# Patient Record
Sex: MALE | Race: WHITE | Employment: FULL TIME | URBAN - METROPOLITAN AREA
[De-identification: names, ages, dates, MRNs, and addresses within clinical notes are randomized per-mention and may not be internally consistent; named-entity substitution may affect disease eponyms.]

---

## 2017-02-09 PROBLEM — K21.9 LPRD (LARYNGOPHARYNGEAL REFLUX DISEASE): Status: ACTIVE | Noted: 2017-02-09

## 2017-02-09 PROBLEM — R09.89 GLOBUS PHARYNGEUS: Status: ACTIVE | Noted: 2017-02-09

## 2017-02-09 PROBLEM — R19.8 GLOBUS PHARYNGEUS: Status: ACTIVE | Noted: 2017-02-09

## 2017-02-09 PROBLEM — R09.A2 GLOBUS PHARYNGEUS: Status: ACTIVE | Noted: 2017-02-09

## 2017-10-03 ENCOUNTER — OFFICE VISIT (OUTPATIENT)
Dept: INTERNAL MEDICINE CLINIC | Facility: CLINIC | Age: 42
End: 2017-10-03

## 2017-10-03 VITALS
WEIGHT: 315 LBS | BODY MASS INDEX: 44.1 KG/M2 | TEMPERATURE: 98 F | HEART RATE: 85 BPM | OXYGEN SATURATION: 97 % | HEIGHT: 71 IN | RESPIRATION RATE: 17 BRPM | DIASTOLIC BLOOD PRESSURE: 84 MMHG | SYSTOLIC BLOOD PRESSURE: 128 MMHG

## 2017-10-03 DIAGNOSIS — Z00.00 ROUTINE GENERAL MEDICAL EXAMINATION AT A HEALTH CARE FACILITY: Primary | ICD-10-CM

## 2017-10-03 DIAGNOSIS — I83.891 VARICOSE VEINS OF RIGHT LOWER EXTREMITY WITH EDEMA: ICD-10-CM

## 2017-10-03 PROBLEM — R09.89 GLOBUS PHARYNGEUS: Status: RESOLVED | Noted: 2017-02-09 | Resolved: 2017-10-03

## 2017-10-03 PROBLEM — R19.8 GLOBUS PHARYNGEUS: Status: RESOLVED | Noted: 2017-02-09 | Resolved: 2017-10-03

## 2017-10-03 PROBLEM — R09.A2 GLOBUS PHARYNGEUS: Status: RESOLVED | Noted: 2017-02-09 | Resolved: 2017-10-03

## 2017-10-03 PROCEDURE — 99396 PREV VISIT EST AGE 40-64: CPT | Performed by: INTERNAL MEDICINE

## 2017-10-03 NOTE — PATIENT INSTRUCTIONS
Return to clinic prn if no better   Patient to continue weight reduction. Patient is fully well versed with his treatment options.

## 2017-10-03 NOTE — PROGRESS NOTES
Whitney Gaona  1975 is a 38year old male. Patient presents with:  Physical    HPI     Varicose veins see Dr. Joan Serrano earlier.     Current Outpatient Prescriptions:  OMEPRAZOLE 40 MG Oral Capsule Delayed Release TAKE 1 CAPSULE BY MOUTH ONE TIME A D Palpitations none. PND (paroxsymal nocturnal dyspnea) none. Gastrointestinal:   Patient denies abdominal pain, acid reflux, blood in stool, vomiting, nausea, heartburn denies any wt gain no change in appetite noted no change in bowel movement noted.  Dysp Trace right lower extremity  Heart sounds: normal.   Murmurs: none. Rhythm: regular. LUNGS:   Auscultation: clear . Chest Shape: normal .   Percussion: normal.   Rales: no .   Respiratory effort: normal .   Rhonchi: no.   Wheezes: no.    ABDOMEN:   Thornell Keri options. The patient indicates understanding of these issues and agrees to the plan. The patient is asked to Return in about 6 months (around 4/3/2018).       Shonda Hamm MD

## 2017-10-09 ENCOUNTER — LAB ENCOUNTER (OUTPATIENT)
Dept: LAB | Age: 42
End: 2017-10-09
Attending: INTERNAL MEDICINE
Payer: COMMERCIAL

## 2017-10-09 DIAGNOSIS — Z00.00 ROUTINE GENERAL MEDICAL EXAMINATION AT A HEALTH CARE FACILITY: ICD-10-CM

## 2017-10-09 PROCEDURE — 84443 ASSAY THYROID STIM HORMONE: CPT

## 2017-10-09 PROCEDURE — 80061 LIPID PANEL: CPT

## 2017-10-09 PROCEDURE — 85025 COMPLETE CBC W/AUTO DIFF WBC: CPT

## 2017-10-09 PROCEDURE — 81003 URINALYSIS AUTO W/O SCOPE: CPT

## 2017-10-09 PROCEDURE — 80053 COMPREHEN METABOLIC PANEL: CPT

## 2017-10-09 PROCEDURE — 83036 HEMOGLOBIN GLYCOSYLATED A1C: CPT

## 2017-10-09 PROCEDURE — 36415 COLL VENOUS BLD VENIPUNCTURE: CPT

## 2017-10-09 PROCEDURE — 84436 ASSAY OF TOTAL THYROXINE: CPT

## 2018-09-08 ENCOUNTER — APPOINTMENT (OUTPATIENT)
Dept: GENERAL RADIOLOGY | Age: 43
End: 2018-09-08
Attending: EMERGENCY MEDICINE
Payer: COMMERCIAL

## 2018-09-08 ENCOUNTER — HOSPITAL ENCOUNTER (OUTPATIENT)
Age: 43
Discharge: HOME | End: 2018-09-08
Attending: EMERGENCY MEDICINE
Payer: COMMERCIAL

## 2018-09-08 VITALS
SYSTOLIC BLOOD PRESSURE: 137 MMHG | TEMPERATURE: 98 F | HEART RATE: 98 BPM | DIASTOLIC BLOOD PRESSURE: 92 MMHG | RESPIRATION RATE: 14 BRPM | OXYGEN SATURATION: 98 %

## 2018-09-08 DIAGNOSIS — L03.116 CELLULITIS OF LEFT LOWER EXTREMITY: Primary | ICD-10-CM

## 2018-09-08 DIAGNOSIS — S80.12XA CONTUSION OF LEFT LOWER EXTREMITY, INITIAL ENCOUNTER: ICD-10-CM

## 2018-09-08 LAB
#LYMPHOCYTE IC: 1.7 X10ˆ3/UL (ref 0.9–3.2)
#MXD IC: 0.7 X10ˆ3/UL (ref 0.1–1)
#NEUTROPHIL IC: 4.6 X10ˆ3/UL (ref 1.3–6.7)
HCT IC: 41.3 % (ref 37–54)
HGB IC: 13.9 G/DL (ref 13–17)
LYMPHOCYTES NFR BLD AUTO: 24.3 %
MCH IC: 28 PG (ref 27–33.2)
MCHC IC: 33.7 G/DL (ref 31–37)
MCV IC: 83.3 FL (ref 80–99)
MIXED CELL %: 9.6 %
NEUTROPHILS NFR BLD AUTO: 66.1 %
PLT IC: 283 X10ˆ3/UL (ref 150–450)
RBC IC: 4.96 X10ˆ6/UL (ref 4.3–5.7)
WBC IC: 7 X10ˆ3/UL (ref 4–13)

## 2018-09-08 PROCEDURE — 99204 OFFICE O/P NEW MOD 45 MIN: CPT

## 2018-09-08 PROCEDURE — 85025 COMPLETE CBC W/AUTO DIFF WBC: CPT | Performed by: EMERGENCY MEDICINE

## 2018-09-08 PROCEDURE — 99214 OFFICE O/P EST MOD 30 MIN: CPT

## 2018-09-08 PROCEDURE — 73590 X-RAY EXAM OF LOWER LEG: CPT | Performed by: EMERGENCY MEDICINE

## 2018-09-08 PROCEDURE — 96365 THER/PROPH/DIAG IV INF INIT: CPT

## 2018-09-08 RX ORDER — CEPHALEXIN 500 MG/1
500 CAPSULE ORAL 4 TIMES DAILY
Qty: 40 CAPSULE | Refills: 0 | Status: SHIPPED | OUTPATIENT
Start: 2018-09-08 | End: 2018-09-11 | Stop reason: ALTCHOICE

## 2018-09-08 NOTE — ED NOTES
Returned from [facility] without problems. Rocephin complete. Tolerated without issues.  No complaints

## 2018-09-08 NOTE — ED PROVIDER NOTES
Patient Seen in: [facility]    History   Patient presents with:  Cellulitis (integumentary, infectious)    Stated Complaint: L leg pain/injury     HPI  This is a 28-year-old male who states he slid into a pool about 1 week ago s respiratory distress. The patient is in no respiratory distress    HEENT: There is no signs of trauma. Oral mucosa is wet.     Lungs: Clear to auscultation without wheezing or retractions    Cardiovascular: Regular without murmurs    Extremities: Good pul in the left tibia/fibula. Mild soft tissue swelling. Dictated by: Chaitanya Campbell MD on 9/08/2018 at 13:18     Approved by: Chaitanya Campbell MD            The patient CBC shows white count 7 hemoglobin 13, platelets are 565.   This is finding cellulitis

## 2018-09-08 NOTE — ED INITIAL ASSESSMENT (HPI)
Pt here after he fell in pool 1 week ago and scraped the left lower leg. Pt now w.  Cellulitis type look that is increasing in size, some tightness in the calf

## 2018-09-11 ENCOUNTER — OFFICE VISIT (OUTPATIENT)
Dept: INTERNAL MEDICINE CLINIC | Facility: CLINIC | Age: 43
End: 2018-09-11
Payer: COMMERCIAL

## 2018-09-11 ENCOUNTER — APPOINTMENT (OUTPATIENT)
Dept: ULTRASOUND IMAGING | Facility: HOSPITAL | Age: 43
End: 2018-09-11
Attending: EMERGENCY MEDICINE
Payer: COMMERCIAL

## 2018-09-11 ENCOUNTER — HOSPITAL ENCOUNTER (EMERGENCY)
Facility: HOSPITAL | Age: 43
Discharge: HOME | End: 2018-09-11
Attending: EMERGENCY MEDICINE
Payer: COMMERCIAL

## 2018-09-11 ENCOUNTER — APPOINTMENT (OUTPATIENT)
Dept: GENERAL RADIOLOGY | Facility: HOSPITAL | Age: 43
End: 2018-09-11
Attending: EMERGENCY MEDICINE
Payer: COMMERCIAL

## 2018-09-11 VITALS
BODY MASS INDEX: 44.82 KG/M2 | RESPIRATION RATE: 16 BRPM | TEMPERATURE: 98 F | HEIGHT: 70 IN | OXYGEN SATURATION: 98 % | SYSTOLIC BLOOD PRESSURE: 148 MMHG | DIASTOLIC BLOOD PRESSURE: 76 MMHG | HEART RATE: 71 BPM | WEIGHT: 313.06 LBS

## 2018-09-11 VITALS
HEART RATE: 87 BPM | TEMPERATURE: 98 F | DIASTOLIC BLOOD PRESSURE: 76 MMHG | OXYGEN SATURATION: 99 % | WEIGHT: 312.5 LBS | RESPIRATION RATE: 16 BRPM | SYSTOLIC BLOOD PRESSURE: 116 MMHG | BODY MASS INDEX: 45 KG/M2

## 2018-09-11 DIAGNOSIS — T14.8XXA HEMATOMA: Primary | ICD-10-CM

## 2018-09-11 DIAGNOSIS — L03.116 CELLULITIS OF LEFT LOWER EXTREMITY: ICD-10-CM

## 2018-09-11 DIAGNOSIS — L03.116 CELLULITIS OF LEFT LOWER EXTREMITY: Primary | ICD-10-CM

## 2018-09-11 PROBLEM — L03.90 CELLULITIS: Status: ACTIVE | Noted: 2018-09-11

## 2018-09-11 LAB
ALBUMIN SERPL-MCNC: 3.8 G/DL (ref 3.5–4.8)
ALBUMIN/GLOB SERPL: 1.2 {RATIO} (ref 1–2)
ALP LIVER SERPL-CCNC: 55 U/L (ref 45–117)
ALT SERPL-CCNC: 30 U/L (ref 17–63)
ANION GAP SERPL CALC-SCNC: 5 MMOL/L (ref 0–18)
AST SERPL-CCNC: 18 U/L (ref 15–41)
BASOPHILS # BLD AUTO: 0.03 X10(3) UL (ref 0–0.1)
BASOPHILS NFR BLD AUTO: 0.5 %
BILIRUB SERPL-MCNC: 0.6 MG/DL (ref 0.1–2)
BUN BLD-MCNC: 22 MG/DL (ref 8–20)
BUN/CREAT SERPL: 21.2 (ref 10–20)
CALCIUM BLD-MCNC: 8.7 MG/DL (ref 8.3–10.3)
CHLORIDE SERPL-SCNC: 104 MMOL/L (ref 101–111)
CO2 SERPL-SCNC: 30 MMOL/L (ref 22–32)
CREAT BLD-MCNC: 1.04 MG/DL (ref 0.7–1.3)
EOSINOPHIL # BLD AUTO: 0.14 X10(3) UL (ref 0–0.3)
EOSINOPHIL NFR BLD AUTO: 2.4 %
ERYTHROCYTE [DISTWIDTH] IN BLOOD BY AUTOMATED COUNT: 12.2 % (ref 11.5–16)
GLOBULIN PLAS-MCNC: 3.3 G/DL (ref 2.5–4)
GLUCOSE BLD-MCNC: 102 MG/DL (ref 70–99)
HCT VFR BLD AUTO: 39.4 % (ref 37–53)
HGB BLD-MCNC: 13.2 G/DL (ref 13–17)
IMMATURE GRANULOCYTE COUNT: 0.02 X10(3) UL (ref 0–1)
IMMATURE GRANULOCYTE RATIO %: 0.3 %
LACTIC ACID: 0.6 MMOL/L (ref 0.5–2)
LYMPHOCYTES # BLD AUTO: 1.39 X10(3) UL (ref 0.9–4)
LYMPHOCYTES NFR BLD AUTO: 24.3 %
M PROTEIN MFR SERPL ELPH: 7.1 G/DL (ref 6.1–8.3)
MCH RBC QN AUTO: 28.1 PG (ref 27–33.2)
MCHC RBC AUTO-ENTMCNC: 33.5 G/DL (ref 31–37)
MCV RBC AUTO: 83.8 FL (ref 80–99)
MONOCYTES # BLD AUTO: 0.59 X10(3) UL (ref 0.1–1)
MONOCYTES NFR BLD AUTO: 10.3 %
NEUTROPHIL ABS PRELIM: 3.56 X10 (3) UL (ref 1.3–6.7)
NEUTROPHILS # BLD AUTO: 3.56 X10(3) UL (ref 1.3–6.7)
NEUTROPHILS NFR BLD AUTO: 62.2 %
OSMOLALITY SERPL CALC.SUM OF ELEC: 292 MOSM/KG (ref 275–295)
PLATELET # BLD AUTO: 252 10(3)UL (ref 150–450)
POTASSIUM SERPL-SCNC: 3.8 MMOL/L (ref 3.6–5.1)
RBC # BLD AUTO: 4.7 X10(6)UL (ref 4.3–5.7)
RED CELL DISTRIBUTION WIDTH-SD: 37.2 FL (ref 35.1–46.3)
SODIUM SERPL-SCNC: 139 MMOL/L (ref 136–144)
WBC # BLD AUTO: 5.7 X10(3) UL (ref 4–13)

## 2018-09-11 PROCEDURE — 87205 SMEAR GRAM STAIN: CPT | Performed by: EMERGENCY MEDICINE

## 2018-09-11 PROCEDURE — 36415 COLL VENOUS BLD VENIPUNCTURE: CPT

## 2018-09-11 PROCEDURE — 80053 COMPREHEN METABOLIC PANEL: CPT | Performed by: EMERGENCY MEDICINE

## 2018-09-11 PROCEDURE — 10061 I&D ABSCESS COMP/MULTIPLE: CPT

## 2018-09-11 PROCEDURE — S0077 INJECTION, CLINDAMYCIN PHOSP: HCPCS | Performed by: EMERGENCY MEDICINE

## 2018-09-11 PROCEDURE — 85025 COMPLETE CBC W/AUTO DIFF WBC: CPT | Performed by: EMERGENCY MEDICINE

## 2018-09-11 PROCEDURE — 96375 TX/PRO/DX INJ NEW DRUG ADDON: CPT

## 2018-09-11 PROCEDURE — 99284 EMERGENCY DEPT VISIT MOD MDM: CPT

## 2018-09-11 PROCEDURE — 87070 CULTURE OTHR SPECIMN AEROBIC: CPT | Performed by: EMERGENCY MEDICINE

## 2018-09-11 PROCEDURE — 87040 BLOOD CULTURE FOR BACTERIA: CPT | Performed by: EMERGENCY MEDICINE

## 2018-09-11 PROCEDURE — 73590 X-RAY EXAM OF LOWER LEG: CPT | Performed by: EMERGENCY MEDICINE

## 2018-09-11 PROCEDURE — 96365 THER/PROPH/DIAG IV INF INIT: CPT

## 2018-09-11 PROCEDURE — 83605 ASSAY OF LACTIC ACID: CPT | Performed by: EMERGENCY MEDICINE

## 2018-09-11 PROCEDURE — 99213 OFFICE O/P EST LOW 20 MIN: CPT | Performed by: INTERNAL MEDICINE

## 2018-09-11 PROCEDURE — 93971 EXTREMITY STUDY: CPT | Performed by: EMERGENCY MEDICINE

## 2018-09-11 RX ORDER — ONDANSETRON 2 MG/ML
4 INJECTION INTRAMUSCULAR; INTRAVENOUS ONCE
Status: COMPLETED | OUTPATIENT
Start: 2018-09-11 | End: 2018-09-11

## 2018-09-11 RX ORDER — CLINDAMYCIN HYDROCHLORIDE 300 MG/1
300 CAPSULE ORAL 3 TIMES DAILY
Qty: 30 CAPSULE | Refills: 0 | Status: SHIPPED | OUTPATIENT
Start: 2018-09-11 | End: 2018-09-18

## 2018-09-11 RX ORDER — CLINDAMYCIN PHOSPHATE 600 MG/50ML
600 INJECTION INTRAVENOUS ONCE
Status: COMPLETED | OUTPATIENT
Start: 2018-09-11 | End: 2018-09-11

## 2018-09-11 RX ORDER — ONDANSETRON 2 MG/ML
INJECTION INTRAMUSCULAR; INTRAVENOUS
Status: DISCONTINUED
Start: 2018-09-11 | End: 2018-09-11

## 2018-09-11 NOTE — ED INITIAL ASSESSMENT (HPI)
Two days ago patient to urgent care for left shin cellulitis. Was given abx, outlined the cellulitis and sent home. About an hour ago patient noticed some redness spreading and swelling down to the left foot.

## 2018-09-11 NOTE — ED PROVIDER NOTES
Patient Seen in: [facility] Emergency Department    History   Patient presents with:  Cellulitis (integumentary, infectious)    Stated Complaint: swelling left lower extremity    HPI    The patient is a 47-year-old male presenting to the emergency r reviewed and negative except as noted above.     Physical Exam     ED Triage Vitals [09/11/18 0115]   /72   Pulse 76   Resp 16   Temp 98.3 °F (36.8 °C)   Temp src Temporal   SpO2 100 %   O2 Device None (Room air)       Current:/76   Pulse 71   T involvement.   Psych: Normal interaction, cooperative with exam       ED Course     Labs Reviewed   COMP METABOLIC PANEL (14) - Abnormal; Notable for the following components:       Result Value    Glucose 102 (*)     BUN 22 (*)     BUN/CREA Ratio 21.2 (*) anesthetized with 5 mL of lidocaine 1%. Skin was cleaned. A linear incision was made with an 11 blade scalpel. The cavity was opened with a hemostat and large amount of old appearing clotted blood was expressed. Hemostasis was achieved.   Culture was co

## 2018-09-11 NOTE — PROGRESS NOTES
Connie Tomas  1975 is a 41year old male. Patient presents with: Follow - Up      HPI:   Seen in  ER this morning. ER note has been reviewed. Here because he already had an appointment scheduled.   An incision drainage with packing put in

## 2018-09-15 ENCOUNTER — OFFICE VISIT (OUTPATIENT)
Dept: INTERNAL MEDICINE CLINIC | Facility: CLINIC | Age: 43
End: 2018-09-15
Payer: COMMERCIAL

## 2018-09-15 DIAGNOSIS — L03.116 CELLULITIS OF LEFT LOWER EXTREMITY: Primary | ICD-10-CM

## 2018-09-15 PROCEDURE — 99213 OFFICE O/P EST LOW 20 MIN: CPT | Performed by: INTERNAL MEDICINE

## 2018-09-15 NOTE — PROGRESS NOTES
Hellen Caicedo  1975 is a 41year old male. No chief complaint on file.       HPI:   Getting  better not 100%    Current Outpatient Medications:  Clindamycin HCl 300 MG Oral Cap Take 1 capsule (300 mg total) by mouth 3 (three) times daily for 1

## 2018-09-18 ENCOUNTER — OFFICE VISIT (OUTPATIENT)
Dept: INTERNAL MEDICINE CLINIC | Facility: CLINIC | Age: 43
End: 2018-09-18
Payer: COMMERCIAL

## 2018-09-18 DIAGNOSIS — Z02.9 ADMINISTRATIVE ENCOUNTER: Primary | ICD-10-CM

## 2018-09-18 RX ORDER — CLINDAMYCIN HYDROCHLORIDE 300 MG/1
300 CAPSULE ORAL 3 TIMES DAILY
Qty: 30 CAPSULE | Refills: 0 | Status: SHIPPED | OUTPATIENT
Start: 2018-09-18 | End: 2018-09-28

## 2018-09-18 NOTE — PROGRESS NOTES
Hellen Caicedo  1975 is a 41year old male. No chief complaint on file. Wound dressing change    Current Outpatient Medications:  Clindamycin HCl 300 MG Oral Cap Take 1 capsule (300 mg total) by mouth 3 (three) times daily for 10 days.  [place] [employer]

## 2018-09-21 ENCOUNTER — OFFICE VISIT (OUTPATIENT)
Dept: INTERNAL MEDICINE CLINIC | Facility: CLINIC | Age: 43
End: 2018-09-21
Payer: COMMERCIAL

## 2018-09-21 DIAGNOSIS — L03.116 CELLULITIS OF LEFT LOWER EXTREMITY: Primary | ICD-10-CM

## 2018-09-27 ENCOUNTER — TELEPHONE (OUTPATIENT)
Dept: INTERNAL MEDICINE CLINIC | Facility: CLINIC | Age: 43
End: 2018-09-27

## 2018-09-27 NOTE — TELEPHONE ENCOUNTER
He should be seen by any available provider to determine if additional course of antibiotics is needed.

## 2018-09-27 NOTE — TELEPHONE ENCOUNTER
Spoke with pt - offered several different OV times. Only OV time that pt could do was tomorrow at 0730. OV scheduled.   Future Appointments   Date Time Provider Nicole Chatmani   9/28/2018  7:30 AM Robert Chowdary MD [facility] [place]

## 2018-09-27 NOTE — TELEPHONE ENCOUNTER
Pt states that cellulitis has not gotten worse and its slowly getting better. Pt denies fever and reports decreased redness, decreased swelling and decreased pain.  Pt states that he only has 3 days left of abx and states that will not be enough for the faizan

## 2018-09-28 ENCOUNTER — OFFICE VISIT (OUTPATIENT)
Dept: INTERNAL MEDICINE CLINIC | Facility: CLINIC | Age: 43
End: 2018-09-28
Payer: COMMERCIAL

## 2018-09-28 VITALS
OXYGEN SATURATION: 97 % | HEART RATE: 70 BPM | WEIGHT: 311.25 LBS | BODY MASS INDEX: 43.57 KG/M2 | TEMPERATURE: 98 F | RESPIRATION RATE: 16 BRPM | SYSTOLIC BLOOD PRESSURE: 120 MMHG | HEIGHT: 71 IN | DIASTOLIC BLOOD PRESSURE: 74 MMHG

## 2018-09-28 DIAGNOSIS — L03.116 CELLULITIS OF LEFT LOWER EXTREMITY: Primary | ICD-10-CM

## 2018-09-28 PROCEDURE — 99214 OFFICE O/P EST MOD 30 MIN: CPT | Performed by: INTERNAL MEDICINE

## 2018-09-28 RX ORDER — CLINDAMYCIN HYDROCHLORIDE 300 MG/1
300 CAPSULE ORAL 3 TIMES DAILY
Qty: 21 CAPSULE | Refills: 0 | Status: SHIPPED | OUTPATIENT
Start: 2018-09-28 | End: 2018-10-05

## 2018-09-28 NOTE — PROGRESS NOTES
Levon Barnes is a 41year old male. HPI:   Patient presents with:  Wound Recheck: Left lower leg   Patient presents for follow up on cellulitis. It is of the left lower extremity. Initially seen at immediate care on 9/8.   Injured left leg 1 week pr Obesity, unspecified, CICI (obstructive sleep apnea), Spondylosis (6/18/2012), and Varicose veins of leg with pain (3/30/2015).   Surgical:  has a past surgical history that includes tonsillectomy; cholecystectomy; and ENDO LUMINAL LASER ABLATION (Right, 11/ Jc Baron MD as PCP - General (Internal Medicine)  The patient indicates understanding of these issues and agrees to the plan.   The patient is asked to return to clinic in 6-12 months with Dr. Ahsan Rosario MD for follow up on chronic issues, or earlier if a

## 2018-09-28 NOTE — PATIENT INSTRUCTIONS
- Continue current antibiotic course (which will end in 3 days)  - After that, we will do another week of antibiotics  - Let us know if you have constant watery diarrhea (soft stools are ok)  - Continue with daily antibiotic.     It was a pleasure seeing yo

## 2018-10-22 ENCOUNTER — OFFICE VISIT (OUTPATIENT)
Dept: INTERNAL MEDICINE CLINIC | Facility: CLINIC | Age: 43
End: 2018-10-22
Payer: COMMERCIAL

## 2018-10-22 VITALS
HEIGHT: 68.5 IN | SYSTOLIC BLOOD PRESSURE: 128 MMHG | WEIGHT: 315 LBS | BODY MASS INDEX: 47.19 KG/M2 | DIASTOLIC BLOOD PRESSURE: 82 MMHG | HEART RATE: 84 BPM | OXYGEN SATURATION: 96 % | RESPIRATION RATE: 13 BRPM | TEMPERATURE: 98 F

## 2018-10-22 DIAGNOSIS — E66.01 CLASS 3 SEVERE OBESITY DUE TO EXCESS CALORIES WITH BODY MASS INDEX (BMI) OF 40.0 TO 44.9 IN ADULT, UNSPECIFIED WHETHER SERIOUS COMORBIDITY PRESENT (HCC): ICD-10-CM

## 2018-10-22 DIAGNOSIS — Z00.00 ROUTINE GENERAL MEDICAL EXAMINATION AT A HEALTH CARE FACILITY: Primary | ICD-10-CM

## 2018-10-22 PROBLEM — E66.813 CLASS 3 SEVERE OBESITY DUE TO EXCESS CALORIES WITH BODY MASS INDEX (BMI) OF 40.0 TO 44.9 IN ADULT (HCC): Status: ACTIVE | Noted: 2018-10-22

## 2018-10-22 PROBLEM — L03.90 CELLULITIS: Status: RESOLVED | Noted: 2018-09-11 | Resolved: 2018-10-22

## 2018-10-22 PROBLEM — E66.813 CLASS 3 SEVERE OBESITY DUE TO EXCESS CALORIES WITH BODY MASS INDEX (BMI) OF 40.0 TO 44.9 IN ADULT: Status: ACTIVE | Noted: 2018-10-22

## 2018-10-22 PROCEDURE — 99396 PREV VISIT EST AGE 40-64: CPT | Performed by: INTERNAL MEDICINE

## 2018-10-22 RX ORDER — OMEPRAZOLE 40 MG/1
40 CAPSULE, DELAYED RELEASE ORAL
Qty: 1 CAPSULE | Refills: 0 | Status: SHIPPED | OUTPATIENT
Start: 2018-10-22 | End: 2018-10-23

## 2018-10-22 NOTE — TELEPHONE ENCOUNTER
Estefania Valentine from [facility] is calling to get clarification on the directions for prescription Omeprazole 40 MG Oral Capsule Delayed Release. Please advise.

## 2018-10-22 NOTE — PROGRESS NOTES
Lobo RUBIO 1975 is a 41year old male. Patient presents with:  CPX    HPI         Current Outpatient Medications:  Omeprazole 40 MG Oral Capsule Delayed Release Take 1 capsule (40 mg total) by mouth every 3 (three) months.  Disp: 1 capsule R cholesterol/hypertension. Leg edema none. Orthopnea no. Palpitations none. PND (paroxsymal nocturnal dyspnea) none.    Gastrointestinal:   Patient denies abdominal pain, , blood in stool, vomiting, nausea, heartburn denies any wt gain no change in appetite normal.   Thyroid: unremarkable. HEART:   Clicks: no.   Edema: Trace right lower extremity  Heart sounds: normal.   Murmurs: none. Rhythm: regular. LUNGS:   Auscultation: clear .    Chest Shape: normal .   Percussion: normal.   Rales: no .   Respirato Release; Take 1 capsule (40 mg total) by mouth every 3 (three) months. Patient Instructions   Will discuss weight loss options on follow-up      The patient indicates understanding of these issues and agrees to the plan.   The patient is asked to Ret

## 2018-10-23 RX ORDER — OMEPRAZOLE 40 MG/1
40 CAPSULE, DELAYED RELEASE ORAL DAILY
Qty: 90 CAPSULE | Refills: 0 | Status: SHIPPED | OUTPATIENT
Start: 2018-10-23 | End: 2019-01-21

## 2018-11-27 ENCOUNTER — APPOINTMENT (OUTPATIENT)
Dept: LAB | Age: 43
End: 2018-11-27
Attending: INTERNAL MEDICINE
Payer: COMMERCIAL

## 2018-11-27 DIAGNOSIS — E66.01 CLASS 3 SEVERE OBESITY DUE TO EXCESS CALORIES WITH BODY MASS INDEX (BMI) OF 40.0 TO 44.9 IN ADULT, UNSPECIFIED WHETHER SERIOUS COMORBIDITY PRESENT (HCC): ICD-10-CM

## 2018-11-27 DIAGNOSIS — Z00.00 ROUTINE GENERAL MEDICAL EXAMINATION AT A HEALTH CARE FACILITY: ICD-10-CM

## 2018-11-27 PROCEDURE — 81003 URINALYSIS AUTO W/O SCOPE: CPT

## 2018-11-27 PROCEDURE — 84681 ASSAY OF C-PEPTIDE: CPT

## 2018-11-27 PROCEDURE — 83525 ASSAY OF INSULIN: CPT

## 2018-11-27 PROCEDURE — 84443 ASSAY THYROID STIM HORMONE: CPT

## 2018-11-27 PROCEDURE — 36415 COLL VENOUS BLD VENIPUNCTURE: CPT

## 2018-11-27 PROCEDURE — 84436 ASSAY OF TOTAL THYROXINE: CPT

## 2018-11-27 PROCEDURE — 80061 LIPID PANEL: CPT

## 2018-11-27 PROCEDURE — 83036 HEMOGLOBIN GLYCOSYLATED A1C: CPT

## 2019-11-08 ENCOUNTER — TELEPHONE (OUTPATIENT)
Dept: INTERNAL MEDICINE CLINIC | Facility: CLINIC | Age: 44
End: 2019-11-08

## 2019-11-08 NOTE — TELEPHONE ENCOUNTER
Letter of medical necessity received from [address] [place] [address] for pts Cpap supplies - form has been placed in provider inbox for completion.

## 2019-11-09 NOTE — TELEPHONE ENCOUNTER
Faxed letter of medical necessity with confirmation to [facility]     Fax # [phone]     Placed copy to scan and also placed copy in teal accordion folder in VM/KS/LS pod

## 2020-01-06 ENCOUNTER — APPOINTMENT (OUTPATIENT)
Dept: LAB | Age: 45
End: 2020-01-06
Attending: INTERNAL MEDICINE
Payer: COMMERCIAL

## 2020-01-06 ENCOUNTER — OFFICE VISIT (OUTPATIENT)
Dept: INTERNAL MEDICINE CLINIC | Facility: CLINIC | Age: 45
End: 2020-01-06
Payer: COMMERCIAL

## 2020-01-06 VITALS
TEMPERATURE: 98 F | HEART RATE: 66 BPM | DIASTOLIC BLOOD PRESSURE: 84 MMHG | BODY MASS INDEX: 43.12 KG/M2 | WEIGHT: 284.5 LBS | SYSTOLIC BLOOD PRESSURE: 118 MMHG | OXYGEN SATURATION: 99 % | RESPIRATION RATE: 16 BRPM | HEIGHT: 68.25 IN

## 2020-01-06 DIAGNOSIS — N50.819 TESTICULAR PAIN, UNSPECIFIED: ICD-10-CM

## 2020-01-06 DIAGNOSIS — Z30.09 VASECTOMY EVALUATION: ICD-10-CM

## 2020-01-06 DIAGNOSIS — Z00.00 ROUTINE GENERAL MEDICAL EXAMINATION AT A HEALTH CARE FACILITY: Primary | ICD-10-CM

## 2020-01-06 DIAGNOSIS — G47.33 OSA (OBSTRUCTIVE SLEEP APNEA): ICD-10-CM

## 2020-01-06 LAB
ALBUMIN SERPL-MCNC: 3.8 G/DL (ref 3.4–5)
ALBUMIN/GLOB SERPL: 1.2 {RATIO} (ref 1–2)
ALP LIVER SERPL-CCNC: 55 U/L (ref 45–117)
ALT SERPL-CCNC: 24 U/L (ref 16–61)
ANION GAP SERPL CALC-SCNC: 2 MMOL/L (ref 0–18)
AST SERPL-CCNC: 13 U/L (ref 15–37)
BASOPHILS # BLD AUTO: 0.04 X10(3) UL (ref 0–0.2)
BASOPHILS NFR BLD AUTO: 0.6 %
BILIRUB SERPL-MCNC: 0.7 MG/DL (ref 0.1–2)
BILIRUB UR QL STRIP.AUTO: NEGATIVE
BUN BLD-MCNC: 16 MG/DL (ref 7–18)
BUN/CREAT SERPL: 15.2 (ref 10–20)
CALCIUM BLD-MCNC: 8.9 MG/DL (ref 8.5–10.1)
CHLORIDE SERPL-SCNC: 108 MMOL/L (ref 98–112)
CHOLEST SMN-MCNC: 146 MG/DL (ref ?–200)
CLARITY UR REFRACT.AUTO: CLEAR
CO2 SERPL-SCNC: 30 MMOL/L (ref 21–32)
COLOR UR AUTO: YELLOW
CREAT BLD-MCNC: 1.05 MG/DL (ref 0.7–1.3)
DEPRECATED RDW RBC AUTO: 39.8 FL (ref 35.1–46.3)
EOSINOPHIL # BLD AUTO: 0.11 X10(3) UL (ref 0–0.7)
EOSINOPHIL NFR BLD AUTO: 1.8 %
ERYTHROCYTE [DISTWIDTH] IN BLOOD BY AUTOMATED COUNT: 12.7 % (ref 11–15)
EST. AVERAGE GLUCOSE BLD GHB EST-MCNC: 108 MG/DL (ref 68–126)
GLOBULIN PLAS-MCNC: 3.3 G/DL (ref 2.8–4.4)
GLUCOSE BLD-MCNC: 93 MG/DL (ref 70–99)
GLUCOSE UR STRIP.AUTO-MCNC: NEGATIVE MG/DL
HBA1C MFR BLD HPLC: 5.4 % (ref ?–5.7)
HCT VFR BLD AUTO: 44.5 % (ref 39–53)
HDLC SERPL-MCNC: 43 MG/DL (ref 40–59)
HGB BLD-MCNC: 14.2 G/DL (ref 13–17.5)
IMM GRANULOCYTES # BLD AUTO: 0.01 X10(3) UL (ref 0–1)
IMM GRANULOCYTES NFR BLD: 0.2 %
KETONES UR STRIP.AUTO-MCNC: NEGATIVE MG/DL
LDLC SERPL CALC-MCNC: 90 MG/DL (ref ?–100)
LEUKOCYTE ESTERASE UR QL STRIP.AUTO: NEGATIVE
LYMPHOCYTES # BLD AUTO: 1.98 X10(3) UL (ref 1–4)
LYMPHOCYTES NFR BLD AUTO: 31.6 %
M PROTEIN MFR SERPL ELPH: 7.1 G/DL (ref 6.4–8.2)
MCH RBC QN AUTO: 27.6 PG (ref 26–34)
MCHC RBC AUTO-ENTMCNC: 31.9 G/DL (ref 31–37)
MCV RBC AUTO: 86.4 FL (ref 80–100)
MONOCYTES # BLD AUTO: 0.5 X10(3) UL (ref 0.1–1)
MONOCYTES NFR BLD AUTO: 8 %
NEUTROPHILS # BLD AUTO: 3.62 X10 (3) UL (ref 1.5–7.7)
NEUTROPHILS # BLD AUTO: 3.62 X10(3) UL (ref 1.5–7.7)
NEUTROPHILS NFR BLD AUTO: 57.8 %
NITRITE UR QL STRIP.AUTO: NEGATIVE
NONHDLC SERPL-MCNC: 103 MG/DL (ref ?–130)
OSMOLALITY SERPL CALC.SUM OF ELEC: 291 MOSM/KG (ref 275–295)
PATIENT FASTING Y/N/NP: YES
PATIENT FASTING Y/N/NP: YES
PH UR STRIP.AUTO: 6 [PH] (ref 4.5–8)
PLATELET # BLD AUTO: 278 10(3)UL (ref 150–450)
POTASSIUM SERPL-SCNC: 4.7 MMOL/L (ref 3.5–5.1)
PROT UR STRIP.AUTO-MCNC: NEGATIVE MG/DL
RBC # BLD AUTO: 5.15 X10(6)UL (ref 4.3–5.7)
RBC UR QL AUTO: NEGATIVE
SODIUM SERPL-SCNC: 140 MMOL/L (ref 136–145)
SP GR UR STRIP.AUTO: 1.02 (ref 1–1.03)
TRIGL SERPL-MCNC: 66 MG/DL (ref 30–149)
TSI SER-ACNC: 1.01 MIU/ML (ref 0.36–3.74)
UROBILINOGEN UR STRIP.AUTO-MCNC: <2 MG/DL
VLDLC SERPL CALC-MCNC: 13 MG/DL (ref 0–30)
WBC # BLD AUTO: 6.3 X10(3) UL (ref 4–11)

## 2020-01-06 PROCEDURE — 80053 COMPREHEN METABOLIC PANEL: CPT | Performed by: INTERNAL MEDICINE

## 2020-01-06 PROCEDURE — 84443 ASSAY THYROID STIM HORMONE: CPT | Performed by: INTERNAL MEDICINE

## 2020-01-06 PROCEDURE — 99396 PREV VISIT EST AGE 40-64: CPT | Performed by: INTERNAL MEDICINE

## 2020-01-06 PROCEDURE — 80061 LIPID PANEL: CPT | Performed by: INTERNAL MEDICINE

## 2020-01-06 PROCEDURE — 81001 URINALYSIS AUTO W/SCOPE: CPT | Performed by: INTERNAL MEDICINE

## 2020-01-06 PROCEDURE — 83036 HEMOGLOBIN GLYCOSYLATED A1C: CPT | Performed by: INTERNAL MEDICINE

## 2020-01-06 PROCEDURE — 36415 COLL VENOUS BLD VENIPUNCTURE: CPT | Performed by: INTERNAL MEDICINE

## 2020-01-06 PROCEDURE — 85025 COMPLETE CBC W/AUTO DIFF WBC: CPT | Performed by: INTERNAL MEDICINE

## 2020-01-06 NOTE — PROGRESS NOTES
Dian Rebollar  1975 is a 45year old male. Patient presents with:  Physical    HPI    no cc    Current Outpatient Medications   Medication Sig Dispense Refill   • Multiple Vitamins-Minerals (MULTIVITAL-M) Oral Tab Take 1 tablet by mouth daily. blood in stool, vomiting, nausea, heartburn \ no change in appetite noted no change in bowel movement noted.  Dysphagia none.: vol weight loss eating low carbs and sensibly with portion control   Hematology:   Patient denies abnormal bleeding, easy bruising Motion: normal.   Thyroid: unremarkable. HEART:   Clicks: no.   Edema: Trace right lower extremity  Heart sounds: normal.   Murmurs: none. Rhythm: regular. LUNGS:   Auscultation: clear . Chest Shape: normal .   Percussion: normal.   Rales: no .    R indicates understanding of these issues and agrees to the plan. The patient is asked to Return in about 6 months (around 7/6/2020) for result discussion.       Shonda Hamm MD

## 2020-05-17 ENCOUNTER — PATIENT MESSAGE (OUTPATIENT)
Dept: INTERNAL MEDICINE CLINIC | Facility: CLINIC | Age: 45
End: 2020-05-17

## 2020-05-17 DIAGNOSIS — G47.33 OSA (OBSTRUCTIVE SLEEP APNEA): Primary | Chronic | ICD-10-CM

## 2020-05-18 NOTE — TELEPHONE ENCOUNTER
From: Levon Barnes  To: Musa Gabriel MD  Sent: 5/17/2020 10:45 AM CDT  Subject: Non-Urgent Medical Question    My cpap machine is pretty old and the humidifier stopped working.  While I have a prescription for parts I don't have one for a machine or a

## 2020-10-19 ENCOUNTER — OFFICE VISIT (OUTPATIENT)
Dept: INTERNAL MEDICINE CLINIC | Facility: CLINIC | Age: 45
End: 2020-10-19
Payer: COMMERCIAL

## 2020-10-19 VITALS
WEIGHT: 303.38 LBS | OXYGEN SATURATION: 98 % | HEIGHT: 68.25 IN | RESPIRATION RATE: 18 BRPM | HEART RATE: 78 BPM | BODY MASS INDEX: 45.98 KG/M2 | DIASTOLIC BLOOD PRESSURE: 76 MMHG | SYSTOLIC BLOOD PRESSURE: 122 MMHG | TEMPERATURE: 98 F

## 2020-10-19 DIAGNOSIS — G47.33 OSA (OBSTRUCTIVE SLEEP APNEA): Chronic | ICD-10-CM

## 2020-10-19 DIAGNOSIS — E66.01 CLASS 3 SEVERE OBESITY DUE TO EXCESS CALORIES WITH SERIOUS COMORBIDITY AND BODY MASS INDEX (BMI) OF 40.0 TO 44.9 IN ADULT (HCC): ICD-10-CM

## 2020-10-19 DIAGNOSIS — R79.89 LOW TESTOSTERONE: Primary | ICD-10-CM

## 2020-10-19 PROCEDURE — 99213 OFFICE O/P EST LOW 20 MIN: CPT | Performed by: INTERNAL MEDICINE

## 2020-10-19 PROCEDURE — 90686 IIV4 VACC NO PRSV 0.5 ML IM: CPT | Performed by: INTERNAL MEDICINE

## 2020-10-19 PROCEDURE — 3008F BODY MASS INDEX DOCD: CPT | Performed by: INTERNAL MEDICINE

## 2020-10-19 PROCEDURE — 90471 IMMUNIZATION ADMIN: CPT | Performed by: INTERNAL MEDICINE

## 2020-10-19 PROCEDURE — 3074F SYST BP LT 130 MM HG: CPT | Performed by: INTERNAL MEDICINE

## 2020-10-19 PROCEDURE — 3078F DIAST BP <80 MM HG: CPT | Performed by: INTERNAL MEDICINE

## 2020-10-19 NOTE — PATIENT INSTRUCTIONS
Discussed with patient the eventuality of trying to lose some weight both by eating properly exercising and possibly getting some supplemental help including referral to weight loss clinic.   Patient wants to first initiate his testosterone treatment prior

## 2020-10-19 NOTE — PROGRESS NOTES
Sallye Spurling  1975 is a 39year old male. Patient presents with:  Test Results      HPI:   Patient went to the specialized clinic was found to have a low testosterone.   Patient has been offered to get a low T treatment in the form of injecta exercising and possibly getting some supplemental help including referral to weight loss clinic.   Patient wants to first initiate his testosterone treatment prior to further options     The patient indicates understanding of these issues and agrees to the

## 2020-11-06 ENCOUNTER — APPOINTMENT (OUTPATIENT)
Dept: GENERAL RADIOLOGY | Facility: HOSPITAL | Age: 45
End: 2020-11-06
Payer: COMMERCIAL

## 2020-11-06 ENCOUNTER — HOSPITAL ENCOUNTER (EMERGENCY)
Facility: HOSPITAL | Age: 45
Discharge: HOME | End: 2020-11-06
Attending: EMERGENCY MEDICINE
Payer: COMMERCIAL

## 2020-11-06 ENCOUNTER — APPOINTMENT (OUTPATIENT)
Dept: CT IMAGING | Facility: HOSPITAL | Age: 45
End: 2020-11-06
Attending: EMERGENCY MEDICINE
Payer: COMMERCIAL

## 2020-11-06 VITALS
SYSTOLIC BLOOD PRESSURE: 131 MMHG | RESPIRATION RATE: 21 BRPM | TEMPERATURE: 99 F | BODY MASS INDEX: 44.43 KG/M2 | DIASTOLIC BLOOD PRESSURE: 84 MMHG | HEIGHT: 69 IN | WEIGHT: 300 LBS | OXYGEN SATURATION: 99 % | HEART RATE: 112 BPM

## 2020-11-06 DIAGNOSIS — R07.9 CHEST PAIN OF UNCERTAIN ETIOLOGY: Primary | ICD-10-CM

## 2020-11-06 PROCEDURE — 71045 X-RAY EXAM CHEST 1 VIEW: CPT

## 2020-11-06 PROCEDURE — 93010 ELECTROCARDIOGRAM REPORT: CPT

## 2020-11-06 PROCEDURE — 84484 ASSAY OF TROPONIN QUANT: CPT | Performed by: EMERGENCY MEDICINE

## 2020-11-06 PROCEDURE — 93005 ELECTROCARDIOGRAM TRACING: CPT

## 2020-11-06 PROCEDURE — 99285 EMERGENCY DEPT VISIT HI MDM: CPT

## 2020-11-06 PROCEDURE — 71275 CT ANGIOGRAPHY CHEST: CPT | Performed by: EMERGENCY MEDICINE

## 2020-11-06 PROCEDURE — 85025 COMPLETE CBC W/AUTO DIFF WBC: CPT

## 2020-11-06 PROCEDURE — 80053 COMPREHEN METABOLIC PANEL: CPT

## 2020-11-06 PROCEDURE — 36415 COLL VENOUS BLD VENIPUNCTURE: CPT

## 2020-11-06 PROCEDURE — 84484 ASSAY OF TROPONIN QUANT: CPT

## 2020-11-06 PROCEDURE — 85025 COMPLETE CBC W/AUTO DIFF WBC: CPT | Performed by: EMERGENCY MEDICINE

## 2020-11-06 PROCEDURE — 80053 COMPREHEN METABOLIC PANEL: CPT | Performed by: EMERGENCY MEDICINE

## 2020-11-06 RX ORDER — ASPIRIN 81 MG/1
324 TABLET, CHEWABLE ORAL ONCE
Status: COMPLETED | OUTPATIENT
Start: 2020-11-06 | End: 2020-11-06

## 2020-11-06 NOTE — ED PROVIDER NOTES
Patient Seen in: [facility] Emergency Department      History   Patient presents with:  Chest Pain Angina    Stated Complaint: chest pain    HPI    57-year-old male presents here to the ER complaining of having chest discomfort after receiving testo equal reactive to light. Extra ocular motions are intact. No scleral icterus or conjunctival pallor: Neck is supple without tenderness on palpation. Head is atraumatic normocephalic. Lungs: Clear to auscultation bilaterally.   No wheezes, rhonchi, or ra angiography is performed through the pulmonary arterial anatomy. 3D volume renderings are generated.  Dose reduction techniques were used.  Dose information is transmitted to the [facility] ([facility]   [facility]) [address]) Unremarkable portable chest radiograph.           Dictated by (CST): Lynn Oppenheim, MD on 11/06/2020 at 1:40 PM       Finalized by (CST): Lynn Oppenheim, MD on 11/06/2020 at 1:40 PM              MDM      The patient troponin was normal.  Metabolic

## 2020-12-14 ENCOUNTER — TELEPHONE (OUTPATIENT)
Dept: INTERNAL MEDICINE CLINIC | Facility: CLINIC | Age: 45
End: 2020-12-14

## 2020-12-14 DIAGNOSIS — G47.33 OSA (OBSTRUCTIVE SLEEP APNEA): Primary | ICD-10-CM

## 2020-12-14 NOTE — TELEPHONE ENCOUNTER
Patient has used CPAP machine \"forever, and it has completely broken. \"  He tried to replace but cannot purchase without an order from his doctor; please callback. Patient states that the order needs to show \"pressure needed for him and 12cm of H2O. \"  P

## 2020-12-28 NOTE — TELEPHONE ENCOUNTER
Titration study faxed to [id number]. Per titration study from 6/25/15, pt may need to do dx study. Pt notified of all above info and verbalized understanding.

## 2020-12-28 NOTE — TELEPHONE ENCOUNTER
Patient calling stating. [employer] needs pressure settings for CPAP machine. Needs to be sent to [facility]. Please call patient when completed.

## 2020-12-31 NOTE — TELEPHONE ENCOUNTER
Patient calling states Devyn Carlson never received revised order. Please call patient. Patient did not have fax number .

## 2021-01-05 NOTE — TELEPHONE ENCOUNTER
Pt is checking on the status of the form for the cpap machine, pt called [facility] about 15min ago, and pt was told they never got it. Please call and advise.

## 2021-01-06 NOTE — TELEPHONE ENCOUNTER
I spoke with pt whom states that Arlyss Cranker faxed us an order form which they are waiting to be filled out and returned. Form located, filled out/signed by VM and faxed to number which [facility] requested for us to fax to which was [phone].  Confirmation re

## 2021-01-07 NOTE — TELEPHONE ENCOUNTER
We do not have a baseline/dx sleep study on file for pt. Pt was aware that this is needed, but still requested for us to send documentation to [facility].      Pt contacted and informed that we received call from [facility] and he will need  baseline/dx sleep cira

## 2021-01-07 NOTE — TELEPHONE ENCOUNTER
Humble Olmedo from [facility] called and stated that they need the baseline sleep study results faxed over today in order to process the patient's C pap machine order.  Please fax baseline sleep study to [phone] Phone # [phone]

## 2021-02-02 ENCOUNTER — HOSPITAL ENCOUNTER (OUTPATIENT)
Dept: ULTRASOUND IMAGING | Age: 46
Discharge: HOME | End: 2021-02-02
Attending: INTERNAL MEDICINE
Payer: COMMERCIAL

## 2021-02-02 ENCOUNTER — TELEPHONE (OUTPATIENT)
Dept: INTERNAL MEDICINE CLINIC | Facility: CLINIC | Age: 46
End: 2021-02-02

## 2021-02-02 DIAGNOSIS — N50.819 PAIN IN TESTICLE, UNSPECIFIED LATERALITY: Primary | ICD-10-CM

## 2021-02-02 DIAGNOSIS — N50.819 PAIN IN TESTICLE, UNSPECIFIED LATERALITY: ICD-10-CM

## 2021-02-02 PROCEDURE — 93975 VASCULAR STUDY: CPT | Performed by: INTERNAL MEDICINE

## 2021-02-02 PROCEDURE — 76870 US EXAM SCROTUM: CPT | Performed by: INTERNAL MEDICINE

## 2021-03-26 ENCOUNTER — MED REC SCAN ONLY (OUTPATIENT)
Dept: INTERNAL MEDICINE CLINIC | Facility: CLINIC | Age: 46
End: 2021-03-26

## 2021-07-21 PROBLEM — M26.609 TMJ DYSFUNCTION: Status: ACTIVE | Noted: 2021-07-21

## 2021-07-21 PROBLEM — H93.299 ABNORMAL AUDITORY PERCEPTION, UNSPECIFIED LATERALITY: Status: ACTIVE | Noted: 2021-07-21

## 2021-10-18 ENCOUNTER — TELEPHONE (OUTPATIENT)
Dept: INTERNAL MEDICINE CLINIC | Facility: CLINIC | Age: 46
End: 2021-10-18

## 2021-10-18 DIAGNOSIS — E66.01 CLASS 3 SEVERE OBESITY DUE TO EXCESS CALORIES WITH SERIOUS COMORBIDITY AND BODY MASS INDEX (BMI) OF 40.0 TO 44.9 IN ADULT (HCC): ICD-10-CM

## 2021-10-18 DIAGNOSIS — Z00.00 LABORATORY EXAM ORDERED AS PART OF ROUTINE GENERAL MEDICAL EXAMINATION: Primary | ICD-10-CM

## 2021-10-20 ENCOUNTER — LAB ENCOUNTER (OUTPATIENT)
Dept: LAB | Age: 46
End: 2021-10-20
Attending: INTERNAL MEDICINE
Payer: COMMERCIAL

## 2021-10-20 PROCEDURE — 36415 COLL VENOUS BLD VENIPUNCTURE: CPT | Performed by: INTERNAL MEDICINE

## 2021-10-20 PROCEDURE — 80061 LIPID PANEL: CPT | Performed by: INTERNAL MEDICINE

## 2021-10-20 PROCEDURE — 83036 HEMOGLOBIN GLYCOSYLATED A1C: CPT | Performed by: INTERNAL MEDICINE

## 2021-10-20 PROCEDURE — 84436 ASSAY OF TOTAL THYROXINE: CPT | Performed by: INTERNAL MEDICINE

## 2021-10-20 PROCEDURE — 85025 COMPLETE CBC W/AUTO DIFF WBC: CPT | Performed by: INTERNAL MEDICINE

## 2021-10-20 PROCEDURE — 81001 URINALYSIS AUTO W/SCOPE: CPT | Performed by: INTERNAL MEDICINE

## 2021-10-20 PROCEDURE — 84443 ASSAY THYROID STIM HORMONE: CPT | Performed by: INTERNAL MEDICINE

## 2021-10-20 PROCEDURE — 80053 COMPREHEN METABOLIC PANEL: CPT | Performed by: INTERNAL MEDICINE

## 2021-10-26 ENCOUNTER — OFFICE VISIT (OUTPATIENT)
Dept: INTERNAL MEDICINE CLINIC | Facility: CLINIC | Age: 46
End: 2021-10-26
Payer: COMMERCIAL

## 2021-10-26 VITALS
SYSTOLIC BLOOD PRESSURE: 148 MMHG | OXYGEN SATURATION: 99 % | WEIGHT: 293.63 LBS | BODY MASS INDEX: 44.5 KG/M2 | RESPIRATION RATE: 18 BRPM | TEMPERATURE: 98 F | HEIGHT: 68 IN | DIASTOLIC BLOOD PRESSURE: 90 MMHG | HEART RATE: 75 BPM

## 2021-10-26 DIAGNOSIS — E66.01 CLASS 3 SEVERE OBESITY DUE TO EXCESS CALORIES WITHOUT SERIOUS COMORBIDITY WITH BODY MASS INDEX (BMI) OF 40.0 TO 44.9 IN ADULT (HCC): ICD-10-CM

## 2021-10-26 DIAGNOSIS — G47.33 OSA (OBSTRUCTIVE SLEEP APNEA): ICD-10-CM

## 2021-10-26 DIAGNOSIS — Z00.00 ROUTINE GENERAL MEDICAL EXAMINATION AT A HEALTH CARE FACILITY: Primary | ICD-10-CM

## 2021-10-26 PROBLEM — R79.89 LOW TESTOSTERONE: Chronic | Status: ACTIVE | Noted: 2020-10-19

## 2021-10-26 PROBLEM — M26.609 TMJ DYSFUNCTION: Status: RESOLVED | Noted: 2021-07-21 | Resolved: 2021-10-26

## 2021-10-26 PROBLEM — H93.299 ABNORMAL AUDITORY PERCEPTION, UNSPECIFIED LATERALITY: Status: RESOLVED | Noted: 2021-07-21 | Resolved: 2021-10-26

## 2021-10-26 PROBLEM — E66.813 CLASS 3 SEVERE OBESITY DUE TO EXCESS CALORIES WITH BODY MASS INDEX (BMI) OF 40.0 TO 44.9 IN ADULT (HCC): Chronic | Status: ACTIVE | Noted: 2018-10-22

## 2021-10-26 PROBLEM — E66.813 CLASS 3 SEVERE OBESITY DUE TO EXCESS CALORIES WITH BODY MASS INDEX (BMI) OF 40.0 TO 44.9 IN ADULT: Chronic | Status: ACTIVE | Noted: 2018-10-22

## 2021-10-26 PROCEDURE — 3008F BODY MASS INDEX DOCD: CPT | Performed by: INTERNAL MEDICINE

## 2021-10-26 PROCEDURE — 3077F SYST BP >= 140 MM HG: CPT | Performed by: INTERNAL MEDICINE

## 2021-10-26 PROCEDURE — 3080F DIAST BP >= 90 MM HG: CPT | Performed by: INTERNAL MEDICINE

## 2021-10-26 PROCEDURE — 99396 PREV VISIT EST AGE 40-64: CPT | Performed by: INTERNAL MEDICINE

## 2021-10-26 NOTE — PATIENT INSTRUCTIONS
Patient given the option of going to the weight loss clinic if he decides to go however seem to be fairly motivated to do it on his own. All labs discussed with patient.

## 2021-10-26 NOTE — PROGRESS NOTES
Liat Chen  1975 is a 42year old male. Patient presents with:  Physical    HPI    no cc    Current Outpatient Medications   Medication Sig Dispense Refill   • ANASTROZOLE OR Take 1 mg by mouth every 7 days.      • TESTOSTERONE TD      • Mult regularly  Gastrointestinal:   Patient denies abdominal pain, , blood in stool, vomiting, nausea, heartburn \ no change in appetite noted no change in bowel movement noted.  Dysphagia none.: vol weight loss eating low carbs and sensibly with portion control NECK:   Carotid bruit: none. Cervical lymph nodes: unremarkable. JVD: none. Range of Motion: normal.   Thyroid: unremarkable. HEART:   Clicks: no.   Edema: Trace right lower extremity  Heart sounds: normal.   Murmurs: none. Rhythm: regular.    L with patient. The patient indicates understanding of these issues and agrees to the plan. The patient is asked to Return in about 1 year (around 10/26/2022).       Rocio Webb MD

## 2022-04-19 ENCOUNTER — HOSPITAL ENCOUNTER (OUTPATIENT)
Age: 47
Discharge: HOME | End: 2022-04-19
Payer: COMMERCIAL

## 2022-04-19 ENCOUNTER — APPOINTMENT (OUTPATIENT)
Dept: CT IMAGING | Age: 47
End: 2022-04-19
Attending: NURSE PRACTITIONER
Payer: COMMERCIAL

## 2022-04-19 VITALS
RESPIRATION RATE: 18 BRPM | WEIGHT: 280 LBS | HEART RATE: 72 BPM | TEMPERATURE: 98 F | BODY MASS INDEX: 40.09 KG/M2 | OXYGEN SATURATION: 98 % | HEIGHT: 70 IN | DIASTOLIC BLOOD PRESSURE: 83 MMHG | SYSTOLIC BLOOD PRESSURE: 140 MMHG

## 2022-04-19 DIAGNOSIS — I88.0 MESENTERIC ADENITIS: ICD-10-CM

## 2022-04-19 DIAGNOSIS — M54.50 ACUTE RIGHT-SIDED LOW BACK PAIN WITHOUT SCIATICA: Primary | ICD-10-CM

## 2022-04-19 LAB
#MXD IC: 0.8 X10ˆ3/UL (ref 0.1–1)
BUN BLD-MCNC: 21 MG/DL (ref 7–18)
CHLORIDE BLD-SCNC: 99 MMOL/L (ref 98–112)
CO2 BLD-SCNC: 31 MMOL/L (ref 21–32)
CREAT BLD-MCNC: 1.1 MG/DL
GLUCOSE BLD-MCNC: 89 MG/DL (ref 70–99)
HCT VFR BLD AUTO: 50 %
HCT VFR BLD CALC: 49 %
HGB BLD-MCNC: 16.1 G/DL
ISTAT IONIZED CALCIUM FOR CHEM 8: 1.17 MMOL/L (ref 1.12–1.32)
LYMPHOCYTES # BLD AUTO: 0.7 X10ˆ3/UL (ref 1–4)
LYMPHOCYTES NFR BLD AUTO: 14.5 %
MCH RBC QN AUTO: 28.3 PG (ref 26–34)
MCHC RBC AUTO-ENTMCNC: 32.2 G/DL (ref 31–37)
MCV RBC AUTO: 88 FL (ref 80–100)
MIXED CELL %: 16.4 %
NEUTROPHILS # BLD AUTO: 3.3 X10ˆ3/UL (ref 1.5–7.7)
NEUTROPHILS NFR BLD AUTO: 69.1 %
PLATELET # BLD AUTO: 250 X10ˆ3/UL (ref 150–450)
POCT BILIRUBIN URINE: NEGATIVE
POCT BLOOD URINE: NEGATIVE
POCT GLUCOSE URINE: NEGATIVE MG/DL
POCT KETONE URINE: NEGATIVE MG/DL
POCT LEUKOCYTE ESTERASE URINE: NEGATIVE
POCT NITRITE URINE: NEGATIVE
POCT PH URINE: 7 (ref 5–8)
POCT PROTEIN URINE: NEGATIVE MG/DL
POCT SPECIFIC GRAVITY URINE: 1.01
POCT URINE COLOR: YELLOW
POCT UROBILINOGEN URINE: 0.2 MG/DL
POTASSIUM BLD-SCNC: 4.4 MMOL/L (ref 3.6–5.1)
RBC # BLD AUTO: 5.68 X10ˆ6/UL
SODIUM BLD-SCNC: 139 MMOL/L (ref 136–145)
WBC # BLD AUTO: 4.8 X10ˆ3/UL (ref 4–11)

## 2022-04-19 PROCEDURE — 36415 COLL VENOUS BLD VENIPUNCTURE: CPT

## 2022-04-19 PROCEDURE — 85025 COMPLETE CBC W/AUTO DIFF WBC: CPT | Performed by: NURSE PRACTITIONER

## 2022-04-19 PROCEDURE — 99214 OFFICE O/P EST MOD 30 MIN: CPT

## 2022-04-19 PROCEDURE — 81002 URINALYSIS NONAUTO W/O SCOPE: CPT | Performed by: NURSE PRACTITIONER

## 2022-04-19 PROCEDURE — 74177 CT ABD & PELVIS W/CONTRAST: CPT | Performed by: NURSE PRACTITIONER

## 2022-04-19 PROCEDURE — 80047 BASIC METABLC PNL IONIZED CA: CPT

## 2022-04-19 RX ORDER — POLYETHYLENE GLYCOL 3350 17 G/17G
17 POWDER, FOR SOLUTION ORAL DAILY
COMMUNITY

## 2022-04-19 RX ORDER — ORPHENADRINE CITRATE 100 MG/1
100 TABLET, EXTENDED RELEASE ORAL 2 TIMES DAILY
Qty: 20 TABLET | Refills: 0 | Status: SHIPPED | OUTPATIENT
Start: 2022-04-19 | End: 2022-04-26

## 2022-04-19 RX ORDER — IOHEXOL 350 MG/ML
100 INJECTION, SOLUTION INTRAVENOUS
Status: COMPLETED | OUTPATIENT
Start: 2022-04-19 | End: 2022-04-19

## 2022-04-19 NOTE — ED INITIAL ASSESSMENT (HPI)
Pt had colonoscopy on Thursday. Pt had polyps removed and clips placed for bleeding. sts that he has been getting low back pain since Friday. Denies pain in abd, nausea or vomiting. Is passing gas.

## 2022-04-23 PROCEDURE — 99283 EMERGENCY DEPT VISIT LOW MDM: CPT

## 2022-04-23 PROCEDURE — 36415 COLL VENOUS BLD VENIPUNCTURE: CPT

## 2022-04-24 ENCOUNTER — HOSPITAL ENCOUNTER (EMERGENCY)
Facility: HOSPITAL | Age: 47
Discharge: HOME | End: 2022-04-24
Attending: EMERGENCY MEDICINE
Payer: COMMERCIAL

## 2022-04-24 VITALS
OXYGEN SATURATION: 97 % | BODY MASS INDEX: 40.09 KG/M2 | HEIGHT: 70 IN | TEMPERATURE: 98 F | HEART RATE: 67 BPM | RESPIRATION RATE: 17 BRPM | WEIGHT: 280 LBS | SYSTOLIC BLOOD PRESSURE: 118 MMHG | DIASTOLIC BLOOD PRESSURE: 77 MMHG

## 2022-04-24 DIAGNOSIS — G89.29 CHRONIC RIGHT-SIDED LOW BACK PAIN WITHOUT SCIATICA: ICD-10-CM

## 2022-04-24 DIAGNOSIS — M54.50 CHRONIC RIGHT-SIDED LOW BACK PAIN WITHOUT SCIATICA: ICD-10-CM

## 2022-04-24 DIAGNOSIS — R30.0 DYSURIA: Primary | ICD-10-CM

## 2022-04-24 LAB
ALBUMIN SERPL-MCNC: 3.7 G/DL (ref 3.4–5)
ALBUMIN/GLOB SERPL: 1.1 {RATIO} (ref 1–2)
ALP LIVER SERPL-CCNC: 48 U/L
ALT SERPL-CCNC: 32 U/L
ANION GAP SERPL CALC-SCNC: 4 MMOL/L (ref 0–18)
AST SERPL-CCNC: 21 U/L (ref 15–37)
BASOPHILS # BLD AUTO: 0.03 X10(3) UL (ref 0–0.2)
BASOPHILS NFR BLD AUTO: 0.3 %
BILIRUB SERPL-MCNC: 0.4 MG/DL (ref 0.1–2)
BILIRUB UR QL STRIP.AUTO: NEGATIVE
BUN BLD-MCNC: 17 MG/DL (ref 7–18)
CALCIUM BLD-MCNC: 9 MG/DL (ref 8.5–10.1)
CHLORIDE SERPL-SCNC: 105 MMOL/L (ref 98–112)
CLARITY UR REFRACT.AUTO: CLEAR
CO2 SERPL-SCNC: 31 MMOL/L (ref 21–32)
COLOR UR AUTO: COLORLESS
CREAT BLD-MCNC: 1.13 MG/DL
EOSINOPHIL # BLD AUTO: 0.14 X10(3) UL (ref 0–0.7)
EOSINOPHIL NFR BLD AUTO: 1.5 %
ERYTHROCYTE [DISTWIDTH] IN BLOOD BY AUTOMATED COUNT: 12.4 %
GLOBULIN PLAS-MCNC: 3.5 G/DL (ref 2.8–4.4)
GLUCOSE BLD-MCNC: 84 MG/DL (ref 70–99)
GLUCOSE UR STRIP.AUTO-MCNC: NEGATIVE MG/DL
HCT VFR BLD AUTO: 49.8 %
HGB BLD-MCNC: 15.6 G/DL
IMM GRANULOCYTES # BLD AUTO: 0.02 X10(3) UL (ref 0–1)
IMM GRANULOCYTES NFR BLD: 0.2 %
KETONES UR STRIP.AUTO-MCNC: NEGATIVE MG/DL
LEUKOCYTE ESTERASE UR QL STRIP.AUTO: NEGATIVE
LYMPHOCYTES # BLD AUTO: 1.79 X10(3) UL (ref 1–4)
LYMPHOCYTES NFR BLD AUTO: 19.8 %
MCH RBC QN AUTO: 27.8 PG (ref 26–34)
MCHC RBC AUTO-ENTMCNC: 31.3 G/DL (ref 31–37)
MCV RBC AUTO: 88.6 FL
MONOCYTES # BLD AUTO: 0.79 X10(3) UL (ref 0.1–1)
MONOCYTES NFR BLD AUTO: 8.7 %
NEUTROPHILS # BLD AUTO: 6.28 X10 (3) UL (ref 1.5–7.7)
NEUTROPHILS # BLD AUTO: 6.28 X10(3) UL (ref 1.5–7.7)
NEUTROPHILS NFR BLD AUTO: 69.5 %
NITRITE UR QL STRIP.AUTO: NEGATIVE
OSMOLALITY SERPL CALC.SUM OF ELEC: 291 MOSM/KG (ref 275–295)
PH UR STRIP.AUTO: 7 [PH] (ref 5–8)
PLATELET # BLD AUTO: 279 10(3)UL (ref 150–450)
POTASSIUM SERPL-SCNC: 4.1 MMOL/L (ref 3.5–5.1)
PROT SERPL-MCNC: 7.2 G/DL (ref 6.4–8.2)
PROT UR STRIP.AUTO-MCNC: NEGATIVE MG/DL
RBC # BLD AUTO: 5.62 X10(6)UL
RBC UR QL AUTO: NEGATIVE
SODIUM SERPL-SCNC: 140 MMOL/L (ref 136–145)
SP GR UR STRIP.AUTO: 1 (ref 1–1.03)
UROBILINOGEN UR STRIP.AUTO-MCNC: <2 MG/DL
WBC # BLD AUTO: 9.1 X10(3) UL (ref 4–11)

## 2022-04-24 PROCEDURE — 85025 COMPLETE CBC W/AUTO DIFF WBC: CPT | Performed by: EMERGENCY MEDICINE

## 2022-04-24 PROCEDURE — 80053 COMPREHEN METABOLIC PANEL: CPT | Performed by: EMERGENCY MEDICINE

## 2022-04-24 PROCEDURE — 81003 URINALYSIS AUTO W/O SCOPE: CPT | Performed by: EMERGENCY MEDICINE

## 2023-01-25 ENCOUNTER — PATIENT MESSAGE (OUTPATIENT)
Dept: INTERNAL MEDICINE CLINIC | Facility: CLINIC | Age: 48
End: 2023-01-25

## 2023-01-25 DIAGNOSIS — Z00.00 LABORATORY EXAM ORDERED AS PART OF ROUTINE GENERAL MEDICAL EXAMINATION: ICD-10-CM

## 2023-01-25 DIAGNOSIS — E66.01 CLASS 3 SEVERE OBESITY DUE TO EXCESS CALORIES WITHOUT SERIOUS COMORBIDITY WITH BODY MASS INDEX (BMI) OF 40.0 TO 44.9 IN ADULT (HCC): Primary | ICD-10-CM

## 2023-01-26 NOTE — TELEPHONE ENCOUNTER
From: Charo Lloyd  To: Srinivasa Loza MD  Sent: 1/25/2023 7:39 PM CST  Subject: Tests for physical     For my upcoming physical can I get the tests (blood/urine) needed so I can complete them prior to the physical.    Thank you

## 2023-02-10 ENCOUNTER — HOSPITAL ENCOUNTER (OUTPATIENT)
Age: 48
Discharge: HOME | End: 2023-02-10
Payer: COMMERCIAL

## 2023-02-10 VITALS
HEART RATE: 80 BPM | RESPIRATION RATE: 18 BRPM | SYSTOLIC BLOOD PRESSURE: 119 MMHG | DIASTOLIC BLOOD PRESSURE: 73 MMHG | OXYGEN SATURATION: 97 % | WEIGHT: 287 LBS | BODY MASS INDEX: 41.09 KG/M2 | HEIGHT: 70 IN | TEMPERATURE: 98 F

## 2023-02-10 DIAGNOSIS — L02.91 ABSCESS: ICD-10-CM

## 2023-02-10 DIAGNOSIS — L03.311 CELLULITIS OF ABDOMINAL WALL: Primary | ICD-10-CM

## 2023-02-10 LAB
#MXD IC: 0.7 X10ˆ3/UL (ref 0.1–1)
BUN BLD-MCNC: 18 MG/DL (ref 7–18)
CHLORIDE BLD-SCNC: 102 MMOL/L (ref 98–112)
CO2 BLD-SCNC: 32 MMOL/L (ref 21–32)
CREAT BLD-MCNC: 1 MG/DL
GFR SERPLBLD BASED ON 1.73 SQ M-ARVRAT: 95 ML/MIN/1.73M2 (ref 60–?)
GLUCOSE BLD-MCNC: 89 MG/DL (ref 70–99)
HCT VFR BLD AUTO: 45.5 %
HCT VFR BLD CALC: 44 %
HGB BLD-MCNC: 14.3 G/DL
ISTAT IONIZED CALCIUM FOR CHEM 8: 1.2 MMOL/L (ref 1.12–1.32)
LYMPHOCYTES # BLD AUTO: 1.1 X10ˆ3/UL (ref 1–4)
LYMPHOCYTES NFR BLD AUTO: 18.3 %
MCH RBC QN AUTO: 26.9 PG (ref 26–34)
MCHC RBC AUTO-ENTMCNC: 31.4 G/DL (ref 31–37)
MCV RBC AUTO: 85.7 FL (ref 80–100)
MIXED CELL %: 11.7 %
NEUTROPHILS # BLD AUTO: 4.3 X10ˆ3/UL (ref 1.5–7.7)
NEUTROPHILS NFR BLD AUTO: 70 %
PLATELET # BLD AUTO: 253 X10ˆ3/UL (ref 150–450)
POTASSIUM BLD-SCNC: 4.7 MMOL/L (ref 3.6–5.1)
RBC # BLD AUTO: 5.31 X10ˆ6/UL
SODIUM BLD-SCNC: 139 MMOL/L (ref 136–145)
WBC # BLD AUTO: 6.1 X10ˆ3/UL (ref 4–11)

## 2023-02-10 PROCEDURE — 87077 CULTURE AEROBIC IDENTIFY: CPT | Performed by: PHYSICIAN ASSISTANT

## 2023-02-10 PROCEDURE — 90471 IMMUNIZATION ADMIN: CPT

## 2023-02-10 PROCEDURE — 10060 I&D ABSCESS SIMPLE/SINGLE: CPT

## 2023-02-10 PROCEDURE — 87205 SMEAR GRAM STAIN: CPT | Performed by: PHYSICIAN ASSISTANT

## 2023-02-10 PROCEDURE — 99215 OFFICE O/P EST HI 40 MIN: CPT

## 2023-02-10 PROCEDURE — 85025 COMPLETE CBC W/AUTO DIFF WBC: CPT | Performed by: PHYSICIAN ASSISTANT

## 2023-02-10 PROCEDURE — 87070 CULTURE OTHR SPECIMN AEROBIC: CPT | Performed by: PHYSICIAN ASSISTANT

## 2023-02-10 PROCEDURE — 80047 BASIC METABLC PNL IONIZED CA: CPT

## 2023-02-10 PROCEDURE — 96365 THER/PROPH/DIAG IV INF INIT: CPT

## 2023-02-10 PROCEDURE — 99214 OFFICE O/P EST MOD 30 MIN: CPT

## 2023-02-10 RX ORDER — CEPHALEXIN 500 MG/1
500 CAPSULE ORAL 4 TIMES DAILY
Qty: 40 CAPSULE | Refills: 0 | Status: SHIPPED | OUTPATIENT
Start: 2023-02-10 | End: 2023-02-20

## 2023-02-10 RX ORDER — CHLORHEXIDINE GLUCONATE 4 G/100ML
30 SOLUTION TOPICAL
Qty: 1 EACH | Refills: 0 | Status: SHIPPED | OUTPATIENT
Start: 2023-02-10

## 2023-02-10 NOTE — DISCHARGE INSTRUCTIONS
Please return to the ER/clinic if symptoms worsen. Follow-up with your PCP in 24-48 hours as needed. Take the full course antibiotics as prescribed in tandem with a probiotic daily. Some good over-the-counter brands or Culturelle, Florastor and align. Packing is in place leave it in for 24 to 48 hours. If anything changes i.e. increased redness swelling or fevers go directly to the emergency room. Otherwise follow-up with your primary care physician for further evaluation and treatment.

## 2023-02-14 ENCOUNTER — LAB ENCOUNTER (OUTPATIENT)
Dept: LAB | Age: 48
End: 2023-02-14
Attending: INTERNAL MEDICINE
Payer: COMMERCIAL

## 2023-02-14 LAB
ALBUMIN SERPL-MCNC: 3.8 G/DL (ref 3.4–5)
ALBUMIN/GLOB SERPL: 1.1 {RATIO} (ref 1–2)
ALP LIVER SERPL-CCNC: 43 U/L
ALT SERPL-CCNC: 35 U/L
ANION GAP SERPL CALC-SCNC: 5 MMOL/L (ref 0–18)
AST SERPL-CCNC: 21 U/L (ref 15–37)
BASOPHILS # BLD AUTO: 0.03 X10(3) UL (ref 0–0.2)
BASOPHILS NFR BLD AUTO: 0.5 %
BILIRUB SERPL-MCNC: 0.5 MG/DL (ref 0.1–2)
BILIRUB UR QL: NEGATIVE
BUN BLD-MCNC: 21 MG/DL (ref 7–18)
BUN/CREAT SERPL: 18.4 (ref 10–20)
CALCIUM BLD-MCNC: 8.9 MG/DL (ref 8.5–10.1)
CHLORIDE SERPL-SCNC: 106 MMOL/L (ref 98–112)
CHOLEST SERPL-MCNC: 144 MG/DL (ref ?–200)
CLARITY UR: CLEAR
CO2 SERPL-SCNC: 29 MMOL/L (ref 21–32)
COLOR UR: YELLOW
CREAT BLD-MCNC: 1.14 MG/DL
DEPRECATED RDW RBC AUTO: 40.1 FL (ref 35.1–46.3)
EOSINOPHIL # BLD AUTO: 0.1 X10(3) UL (ref 0–0.7)
EOSINOPHIL NFR BLD AUTO: 1.8 %
ERYTHROCYTE [DISTWIDTH] IN BLOOD BY AUTOMATED COUNT: 12.9 % (ref 11–15)
EST. AVERAGE GLUCOSE BLD GHB EST-MCNC: 103 MG/DL (ref 68–126)
FASTING PATIENT LIPID ANSWER: YES
FASTING STATUS PATIENT QL REPORTED: YES
GFR SERPLBLD BASED ON 1.73 SQ M-ARVRAT: 80 ML/MIN/1.73M2 (ref 60–?)
GLOBULIN PLAS-MCNC: 3.4 G/DL (ref 2.8–4.4)
GLUCOSE BLD-MCNC: 94 MG/DL (ref 70–99)
GLUCOSE UR-MCNC: NEGATIVE MG/DL
HBA1C MFR BLD: 5.2 % (ref ?–5.7)
HCT VFR BLD AUTO: 44.3 %
HDLC SERPL-MCNC: 38 MG/DL (ref 40–59)
HGB BLD-MCNC: 14.3 G/DL
HGB UR QL STRIP.AUTO: NEGATIVE
IMM GRANULOCYTES # BLD AUTO: 0.02 X10(3) UL (ref 0–1)
IMM GRANULOCYTES NFR BLD: 0.4 %
KETONES UR-MCNC: NEGATIVE MG/DL
LDLC SERPL CALC-MCNC: 94 MG/DL (ref ?–100)
LEUKOCYTE ESTERASE UR QL STRIP.AUTO: NEGATIVE
LYMPHOCYTES # BLD AUTO: 1.43 X10(3) UL (ref 1–4)
LYMPHOCYTES NFR BLD AUTO: 25.5 %
MCH RBC QN AUTO: 27.3 PG (ref 26–34)
MCHC RBC AUTO-ENTMCNC: 32.3 G/DL (ref 31–37)
MCV RBC AUTO: 84.7 FL
MONOCYTES # BLD AUTO: 0.51 X10(3) UL (ref 0.1–1)
MONOCYTES NFR BLD AUTO: 9.1 %
NEUTROPHILS # BLD AUTO: 3.51 X10 (3) UL (ref 1.5–7.7)
NEUTROPHILS # BLD AUTO: 3.51 X10(3) UL (ref 1.5–7.7)
NEUTROPHILS NFR BLD AUTO: 62.7 %
NITRITE UR QL STRIP.AUTO: NEGATIVE
NONHDLC SERPL-MCNC: 106 MG/DL (ref ?–130)
OSMOLALITY SERPL CALC.SUM OF ELEC: 293 MOSM/KG (ref 275–295)
PH UR: 7 [PH] (ref 5–8)
PLATELET # BLD AUTO: 259 10(3)UL (ref 150–450)
POTASSIUM SERPL-SCNC: 4.3 MMOL/L (ref 3.5–5.1)
PROT SERPL-MCNC: 7.2 G/DL (ref 6.4–8.2)
PROT UR-MCNC: NEGATIVE MG/DL
RBC # BLD AUTO: 5.23 X10(6)UL
SODIUM SERPL-SCNC: 140 MMOL/L (ref 136–145)
SP GR UR STRIP: 1.01 (ref 1–1.03)
T4 SERPL-MCNC: 8.9 UG/DL
TRIGL SERPL-MCNC: 60 MG/DL (ref 30–149)
TSI SER-ACNC: 1.77 MIU/ML (ref 0.36–3.74)
UROBILINOGEN UR STRIP-ACNC: <2
VIT C UR-MCNC: 20 MG/DL
VLDLC SERPL CALC-MCNC: 10 MG/DL (ref 0–30)
WBC # BLD AUTO: 5.6 X10(3) UL (ref 4–11)

## 2023-02-14 PROCEDURE — 84436 ASSAY OF TOTAL THYROXINE: CPT | Performed by: INTERNAL MEDICINE

## 2023-02-14 PROCEDURE — 84443 ASSAY THYROID STIM HORMONE: CPT | Performed by: INTERNAL MEDICINE

## 2023-02-14 PROCEDURE — 80053 COMPREHEN METABOLIC PANEL: CPT | Performed by: INTERNAL MEDICINE

## 2023-02-14 PROCEDURE — 85025 COMPLETE CBC W/AUTO DIFF WBC: CPT | Performed by: INTERNAL MEDICINE

## 2023-02-14 PROCEDURE — 81001 URINALYSIS AUTO W/SCOPE: CPT | Performed by: INTERNAL MEDICINE

## 2023-02-14 PROCEDURE — 80061 LIPID PANEL: CPT | Performed by: INTERNAL MEDICINE

## 2023-02-14 PROCEDURE — 83036 HEMOGLOBIN GLYCOSYLATED A1C: CPT | Performed by: INTERNAL MEDICINE

## 2023-02-23 ENCOUNTER — OFFICE VISIT (OUTPATIENT)
Dept: INTERNAL MEDICINE CLINIC | Facility: CLINIC | Age: 48
End: 2023-02-23
Payer: COMMERCIAL

## 2023-02-23 VITALS
HEIGHT: 68 IN | OXYGEN SATURATION: 99 % | SYSTOLIC BLOOD PRESSURE: 150 MMHG | HEART RATE: 81 BPM | WEIGHT: 279.81 LBS | BODY MASS INDEX: 42.41 KG/M2 | TEMPERATURE: 98 F | DIASTOLIC BLOOD PRESSURE: 90 MMHG | RESPIRATION RATE: 16 BRPM

## 2023-02-23 DIAGNOSIS — I10 PRIMARY HYPERTENSION: ICD-10-CM

## 2023-02-23 DIAGNOSIS — R79.89 LOW TESTOSTERONE: Chronic | ICD-10-CM

## 2023-02-23 DIAGNOSIS — E66.01 CLASS 3 SEVERE OBESITY DUE TO EXCESS CALORIES WITH SERIOUS COMORBIDITY AND BODY MASS INDEX (BMI) OF 40.0 TO 44.9 IN ADULT (HCC): Chronic | ICD-10-CM

## 2023-02-23 DIAGNOSIS — Z00.00 ROUTINE GENERAL MEDICAL EXAMINATION AT A HEALTH CARE FACILITY: Primary | ICD-10-CM

## 2023-02-23 PROCEDURE — 99396 PREV VISIT EST AGE 40-64: CPT | Performed by: INTERNAL MEDICINE

## 2023-02-23 PROCEDURE — 90471 IMMUNIZATION ADMIN: CPT | Performed by: INTERNAL MEDICINE

## 2023-02-23 PROCEDURE — 3008F BODY MASS INDEX DOCD: CPT | Performed by: INTERNAL MEDICINE

## 2023-02-23 PROCEDURE — 90686 IIV4 VACC NO PRSV 0.5 ML IM: CPT | Performed by: INTERNAL MEDICINE

## 2023-02-23 PROCEDURE — 3077F SYST BP >= 140 MM HG: CPT | Performed by: INTERNAL MEDICINE

## 2023-02-23 PROCEDURE — 3080F DIAST BP >= 90 MM HG: CPT | Performed by: INTERNAL MEDICINE

## 2023-02-23 RX ORDER — ANASTROZOLE 1 MG/1
TABLET ORAL
COMMUNITY
Start: 2022-12-03

## 2023-04-28 ENCOUNTER — TELEMEDICINE (OUTPATIENT)
Dept: INTERNAL MEDICINE CLINIC | Facility: CLINIC | Age: 48
End: 2023-04-28
Payer: COMMERCIAL

## 2023-04-28 VITALS — WEIGHT: 279 LBS | HEIGHT: 70 IN | BODY MASS INDEX: 39.94 KG/M2

## 2023-04-28 DIAGNOSIS — E66.01 OBESITY, MORBID, BMI 40.0-49.9 (HCC): ICD-10-CM

## 2023-04-28 DIAGNOSIS — G47.33 OSA (OBSTRUCTIVE SLEEP APNEA): Chronic | ICD-10-CM

## 2023-04-28 DIAGNOSIS — M51.36 DDD (DEGENERATIVE DISC DISEASE), LUMBAR: Chronic | ICD-10-CM

## 2023-04-28 DIAGNOSIS — Z51.81 THERAPEUTIC DRUG MONITORING: Primary | ICD-10-CM

## 2023-04-28 PROCEDURE — 3008F BODY MASS INDEX DOCD: CPT | Performed by: NURSE PRACTITIONER

## 2023-04-28 PROCEDURE — 99203 OFFICE O/P NEW LOW 30 MIN: CPT | Performed by: NURSE PRACTITIONER

## 2023-04-28 NOTE — PATIENT INSTRUCTIONS
Welcome to the [facility]!!  Thank you for placing your trust in our health care team, I look forward to working with you along this journey to better health! Next steps:     1.  Schedule a personal nutrition consultation with one of our registered dieticians. Bring along your food journal (3 days minimum). See journal options below. 2.   Check with insurance on coverage for GLP-1 medications:  (ie. saxenda- daily, wegovy- weekly)   4. Check out interm. Fasting 16:8 - Dr. Todd Aquino      Please try to work on the following dietary changes this first month:  Daily protein recommendation to start: 110-150 grams  Daily carbohydrate: <150g  Daily calories: 2,000   1. Drink water with meals and throughout the day, cut down on soda and/or juice if consumed. Consider flavored water options like Bubbly, Spindrift, Hint and Elina. 2.  Eat breakfast daily and focus on having protein with each meal, examples include: greek yogurt, cottage cheese, hard boiled egg, whole grain toast with peanut butter. 3.  Reduce refined carbohydrates and sugars which includes items such as sweets, as well as rice, pasta, and bread and make sure to choose whole grain options when having them with just 1 serving per meal about the size of your inner palm. 4.  Consume non starchy veggies daily working towards making them a good 50% of your daily food intake. Add them to lunch and dinner consistently. 5.  Start a daily probiotic: VSL#3 is recommended, (order on line at www.vsl3. com). Take 1 capsule daily with water for 30 days, then reduce to 1 every other day (this will reduce the cost). Capsules can be left out for 2 weeks, but then must be refrigerated. Please download bebeto My Fitness VTEX Prim! Or Net Diary to monitor daily dietary intake and you will be able to see if you are eating the right amount of calories or too much or too little which would hinder weight loss.  Additionally this will help to see your daily carbohydrate and protein intake. When you set the dieog up choose 1-2 lbs/week as a goal.  Keeping a paper food journal is an option as well to remain accountable for your choices- this is the start to mindful eating! A low calorie diet has been consistently shown to support weight loss. Continue or start exercising to help establish a routine. If not already exercising begin with 1 day and progress as able with long-term goal of 30 minutes 5 days a week at a minimum. Meditation daily can help manage and control stress. Chronic stress can make weight loss difficult. Exercising is one way to help with stress, but meditation using the CALM Diego or another comparable alternative can be done in your home or place of work with little time commitment. This Diego can also help work on behavior change and improve sleep. Check out the segment under Calm Masterclass and listen to The 4 Pillars of Health. A great way to begin learning about the foundation of lifestyle with practical tips to use in your every day. Check out www.yourweightmatters. org blog for continued daily support and education along this weight loss journey! Patient Resources:     Personal Training/Fitness Classes/Health Coaching     Heidy Escalante and Stroud Sophiaside @ [email or web address]/ Full fitness center with group fitness and personal training. Discount available as client of [facility]. Health Coaching and Personal Training with Barrera Constantino at our [facility]- individual weekly coaching with option to add personal training and small group fitness classes targeted at weight loss- [phone] and/or email @ Tacy [email or web address]. [email or web address]  360FIT Jone [email or web address] Group Fitness [phone] and/or email Karon Fish at [email or web address]. [email or web address]  [address] with multiple locations: OlvinSpoofem.comherminia (www.RxEye), Eat The Cruz & Minor (www.eatthefrogFyusionness. com), Fit Body Bootcamp (www.fitbodybootcamp.Pharmly), Aigou Fitness (www.Sensus Experience), The Exercise  (www.exercisecoach.Pharmly)     Online Fitness  Fitness  on [employer] in 10 DVD series- www. zudyw75TWQ. Pharmly  Sit and Be Fit - Chair exercise series Www.sitandbefit. org  Hip Hop Fit with Terrell Zhao at www.hiphopfit. net     Apps for on PubMatic 7 Minute Workout (orange box with white 7) - free on the go HIIT training diego  Peloton Diego @ wwwAdHack     Nutrition Trackers and Tools  LoseIT! And My Fitness Pal apps and on line for tracking nutrition  NOOM - virtual health coaching  FitFoundation (healthy meals on the go) in [facility] @ www. djmozmrgdbzoz9l. Wanda Voss MD @ wwwCyber Gifts and Deangelo Archibald (keto and low carb plans recommended) @ www. GCLAMO85.QCT, Metabolic Meals @ www. MyMetabolicMeals. com - individual prepared meals to go  Sophono, Fwd: Power, [employer], Every Plate, Little [employer]- on line meal delivery programs for preparation at home  AK Smart Adventure in [place] for homemade meals to go @ www.mealConnesta. Pharmly  Diet Doctor @ www. dietdoctor. com - low carb swaps  ChartSpan [employer] Technologies - meal prep and planning diego (www.yummly. com)     Stress Management/Behavior/Mindful Eating  CALM meditation diego (www.calm. Pharmly)  Headspace  Am I Hungry? Mindful eating virtual  diego  Www.yourweightmatters. org - Obesity Action Coalition sponsored Blog posts daily  Motivation diego (black box with white \")- daily supportive messages sent to your phone     Books/Video Education/Podcasts  Mindless Eating by Faiza Mccullough  Why We Get Sick by Angie Mccullough (a book about insulin resistance)  Atomic Habits by Corinna Church (a book about taking small steps to promote greater behavior change)   Can't Hurt Me by Muriel Harrell (a book exploring the power of discipline in achieving your goals)  The End of Dieting:  How to Live for Life by Dr. Stuart Morris M.D. or listen to The [facility] Episode 63: Understanding \"Nutritarian\" Eating w/Dr. Stuart Morris  Your Body in Balance: The [employer] of Food, Hormones, and Health by Dr. Betty Flaherty  The Menopause Diet Plan by Je Hays and [facility] AT [facility]  The Complete Guide to fasting by Dr. Chuck Cheng, [facility] by Michael Sutton, Ph.D, R.D. Weight Loss Surgery Will Not Treat Food Addiction by Sarahy Bennett Ph.D  The [address] [place] [address] on plant based nutrition  Fed Up - documentary about obesity (Free on IntelligentMDx)  The Truth About Sugar - documentary on sugar (Free on Astech, https://youtu. be/2D1zqmkGZ4l)  The Dr. Ruffin Gaucher by Dr. Bard Anyi MD  Fitlosophy Fitspiration - journal to better health (found at [employer] in fitness aisle)  What Happened to You?- a look at the impact trauma has on behavior written by Santo Villegas and Dr. Kim Malhotra Again by Sonal Muñiz - discovering your true self after trauma  Edwardo Sanford talk on Cyber Interns, The Call to Courage  Podcasts: The Exam Room by the Physician's Committee, Nutrition Facts by Dr. Violet Stroud    We are here to support you with weight loss, but please remember that you still need your primary care provider for your routine health maintenance.

## 2023-06-29 ENCOUNTER — OFFICE VISIT (OUTPATIENT)
Dept: INTERNAL MEDICINE CLINIC | Facility: CLINIC | Age: 48
End: 2023-06-29
Payer: COMMERCIAL

## 2023-06-29 VITALS
RESPIRATION RATE: 16 BRPM | HEART RATE: 73 BPM | TEMPERATURE: 98 F | WEIGHT: 287.81 LBS | DIASTOLIC BLOOD PRESSURE: 90 MMHG | SYSTOLIC BLOOD PRESSURE: 142 MMHG | HEIGHT: 68 IN | OXYGEN SATURATION: 99 % | BODY MASS INDEX: 43.62 KG/M2

## 2023-06-29 DIAGNOSIS — K42.9 UMBILICAL HERNIA WITHOUT OBSTRUCTION AND WITHOUT GANGRENE: Primary | ICD-10-CM

## 2023-06-29 PROCEDURE — 99212 OFFICE O/P EST SF 10 MIN: CPT | Performed by: INTERNAL MEDICINE

## 2023-06-29 PROCEDURE — 3008F BODY MASS INDEX DOCD: CPT | Performed by: INTERNAL MEDICINE

## 2023-06-29 PROCEDURE — 3077F SYST BP >= 140 MM HG: CPT | Performed by: INTERNAL MEDICINE

## 2023-06-29 PROCEDURE — 3080F DIAST BP >= 90 MM HG: CPT | Performed by: INTERNAL MEDICINE

## 2023-10-30 ENCOUNTER — OFFICE VISIT (OUTPATIENT)
Dept: FAMILY MEDICINE CLINIC | Facility: CLINIC | Age: 48
End: 2023-10-30

## 2023-10-30 ENCOUNTER — TELEPHONE (OUTPATIENT)
Dept: FAMILY MEDICINE CLINIC | Facility: CLINIC | Age: 48
End: 2023-10-30

## 2023-10-30 VITALS
SYSTOLIC BLOOD PRESSURE: 142 MMHG | RESPIRATION RATE: 16 BRPM | HEIGHT: 68 IN | TEMPERATURE: 97 F | BODY MASS INDEX: 45.33 KG/M2 | OXYGEN SATURATION: 98 % | WEIGHT: 299.13 LBS | DIASTOLIC BLOOD PRESSURE: 92 MMHG | HEART RATE: 78 BPM

## 2023-10-30 DIAGNOSIS — Z23 NEED FOR INFLUENZA VACCINATION: ICD-10-CM

## 2023-10-30 DIAGNOSIS — R03.0 ELEVATED BP WITHOUT DIAGNOSIS OF HYPERTENSION: Primary | ICD-10-CM

## 2023-10-30 PROCEDURE — 3008F BODY MASS INDEX DOCD: CPT | Performed by: STUDENT IN AN ORGANIZED HEALTH CARE EDUCATION/TRAINING PROGRAM

## 2023-10-30 PROCEDURE — 90686 IIV4 VACC NO PRSV 0.5 ML IM: CPT | Performed by: STUDENT IN AN ORGANIZED HEALTH CARE EDUCATION/TRAINING PROGRAM

## 2023-10-30 PROCEDURE — 3077F SYST BP >= 140 MM HG: CPT | Performed by: STUDENT IN AN ORGANIZED HEALTH CARE EDUCATION/TRAINING PROGRAM

## 2023-10-30 PROCEDURE — 90471 IMMUNIZATION ADMIN: CPT | Performed by: STUDENT IN AN ORGANIZED HEALTH CARE EDUCATION/TRAINING PROGRAM

## 2023-10-30 PROCEDURE — 3080F DIAST BP >= 90 MM HG: CPT | Performed by: STUDENT IN AN ORGANIZED HEALTH CARE EDUCATION/TRAINING PROGRAM

## 2023-10-30 PROCEDURE — 99213 OFFICE O/P EST LOW 20 MIN: CPT | Performed by: STUDENT IN AN ORGANIZED HEALTH CARE EDUCATION/TRAINING PROGRAM

## 2023-10-30 RX ORDER — SEMAGLUTIDE 0.68 MG/ML
0.25 INJECTION, SOLUTION SUBCUTANEOUS WEEKLY
Qty: 3 ML | Refills: 0 | Status: SHIPPED | OUTPATIENT
Start: 2023-10-30 | End: 2023-11-29

## 2023-10-30 NOTE — TELEPHONE ENCOUNTER
Received fax from [facility] requesting a PA for Ozem[employer]. Key: [id number]  Fax placed in MA folder for Dr. Chad Cancino.

## 2023-11-08 ENCOUNTER — OFFICE VISIT (OUTPATIENT)
Dept: SURGERY | Facility: CLINIC | Age: 48
End: 2023-11-08
Payer: COMMERCIAL

## 2023-11-08 VITALS — TEMPERATURE: 97 F | HEART RATE: 87 BPM

## 2023-11-08 DIAGNOSIS — K43.9 VENTRAL HERNIA WITHOUT OBSTRUCTION OR GANGRENE: Primary | ICD-10-CM

## 2023-11-08 PROCEDURE — 99204 OFFICE O/P NEW MOD 45 MIN: CPT | Performed by: SURGERY

## 2024-01-08 ENCOUNTER — OFFICE VISIT (OUTPATIENT)
Dept: FAMILY MEDICINE CLINIC | Facility: CLINIC | Age: 49
End: 2024-01-08
Payer: COMMERCIAL

## 2024-01-08 VITALS
WEIGHT: 297 LBS | TEMPERATURE: 98 F | BODY MASS INDEX: 45.01 KG/M2 | HEIGHT: 68 IN | SYSTOLIC BLOOD PRESSURE: 130 MMHG | HEART RATE: 84 BPM | OXYGEN SATURATION: 95 % | DIASTOLIC BLOOD PRESSURE: 84 MMHG

## 2024-01-08 DIAGNOSIS — R05.8 POST-VIRAL COUGH SYNDROME: Primary | ICD-10-CM

## 2024-01-08 PROCEDURE — 99213 OFFICE O/P EST LOW 20 MIN: CPT | Performed by: NURSE PRACTITIONER

## 2024-01-08 PROCEDURE — 3075F SYST BP GE 130 - 139MM HG: CPT | Performed by: NURSE PRACTITIONER

## 2024-01-08 PROCEDURE — 3008F BODY MASS INDEX DOCD: CPT | Performed by: NURSE PRACTITIONER

## 2024-01-08 PROCEDURE — 3079F DIAST BP 80-89 MM HG: CPT | Performed by: NURSE PRACTITIONER

## 2024-01-08 RX ORDER — PREDNISONE 20 MG/1
TABLET ORAL
Qty: 10 TABLET | Refills: 0 | Status: SHIPPED | OUTPATIENT
Start: 2024-01-08

## 2024-01-08 RX ORDER — BENZONATATE 200 MG/1
200 CAPSULE ORAL 3 TIMES DAILY PRN
Qty: 20 CAPSULE | Refills: 0 | Status: SHIPPED | OUTPATIENT
Start: 2024-01-08 | End: 2024-01-15

## 2024-01-08 NOTE — PROGRESS NOTES
CHIEF COMPLAINT:     Chief Complaint   Patient presents with    Cough     X 1 week Dry cough, headache  Covid test, negative at beginning of week       HPI:   Emeka Turpin is a 48 year old male who presents for upper respiratory symptoms for  1 weeks. Patient reports dry cough, coughing fits that will cause a headache. Symptoms have been persistent since onset.  Treating symptoms with otc.  Pt had cold last week which resolved, cough remains  At home covid negative      Current Outpatient Medications   Medication Sig Dispense Refill    predniSONE 20 MG Oral Tab 2 tabs PO QAM x 5 days 10 tablet 0    benzonatate 200 MG Oral Cap Take 1 capsule (200 mg total) by mouth 3 (three) times daily as needed for cough. 20 capsule 0    Multiple Vitamins-Minerals (AIRBORNE OR) Take by mouth.      Cholecalciferol (VITAMIN D-3 OR) Take by mouth.      anastrozole 1 MG Oral Tab tab TAKE 1 TABLET BY MOUTH 48 HOURS AFTER EACH TESTOSTERONE INJECTION      TESTOSTERONE TD 1 x  WEEK      Multiple Vitamins-Minerals (MULTIVITAL-M) Oral Tab Take 1 tablet by mouth daily.        Past Medical History:   Diagnosis Date    Arthritis     Back problem     DJD    DDD (degenerative disc disease), lumbar 06/18/2012    GERD (gastroesophageal reflux disease)     RESOLVED WITH GLUTEN-FREE DIET    Hx of motion sickness     LPRD (laryngopharyngeal reflux disease) 02/09/2017    Obesity, unspecified     CICI (obstructive sleep apnea)     Sleep apnea     CPAP    Spondylosis 06/18/2012    Varicose veins of leg with pain 03/30/2015    Visual impairment     GLASSES      Past Surgical History:   Procedure Laterality Date    CHOLECYSTECTOMY      COLONOSCOPY  2022    ENDOVENOUS LASER, 1ST VEIN Right 11/13/2015    Procedure: ENDO LUMINAL LASER ABLATION;  Surgeon: LIONEL Lorenzana MD;  Location: [facility]    TONSILLECTOMY           Social History     Socioeconomic History    Marital status:    Tobacco Use    Smoking status: Former     Types: Cigarettes     Smokeless tobacco: Never   Vaping Use    Vaping Use: Never used   Substance and Sexual Activity    Alcohol use: Not Currently    Drug use: No   Other Topics Concern    Caffeine Concern No    Exercise Yes    Seat Belt No    Special Diet No    Stress Concern No    Weight Concern Yes         REVIEW OF SYSTEMS:   GENERAL: intact appetite  SKIN: no rashes or abnormal skin lesions  HEENT: See HPI  LUNGS: See HPI  CARDIOVASCULAR: denies chest pain or palpitations   GI: denies N/V/C or abdominal pain      EXAM:   /84   Pulse 84   Temp 97.6 °F (36.4 °C) (Temporal)   Ht 5' 8\" (1.727 m)   Wt 297 lb (134.7 kg)   SpO2 95%   BMI 45.16 kg/m²   GENERAL: well developed, well nourished,in no apparent distress  SKIN: no rashes,no suspicious lesions  HEAD: atraumatic, normocephalic.  no tenderness on palpation of sinuses  EYES: conjunctiva clear, EOM intact  EARS: TM's grey, no bulging, no retraction, no fluid, bony landmarks visible  NOSE: Nostrils patent, no nasal discharge, nasal mucosa pink, moist   THROAT: Oral mucosa pink, moist. Posterior pharynx is not erythematous. no exudates.     NECK: Supple, non-tender  LUNGS: clear to auscultation bilaterally, no wheezes or rhonchi. Breathing is non labored. + dry cough  CARDIO: RRR without murmur  EXTREMITIES: no cyanosis, clubbing or edema  LYMPH:  no cervical lymphadenopathy.    PSYCH: pleasant mood and affect  NEURO: no focal deficits      ASSESSMENT AND PLAN:   Emeka Turpin is a 48 year old male who presents with upper respiratory symptoms that are consistent with    ASSESSMENT:   Encounter Diagnosis   Name Primary?    Post-viral cough syndrome Yes       PLAN: Meds as below.  Comfort care as described in Patient Instructions    Meds & Refills for this Visit:  Requested Prescriptions     Signed Prescriptions Disp Refills    predniSONE 20 MG Oral Tab 10 tablet 0     Si tabs PO QAM x 5 days    benzonatate 200 MG Oral Cap 20 capsule 0     Sig: Take 1 capsule (200 mg  total) by mouth 3 (three) times daily as needed for cough.     Risks, benefits, and side effects of medication explained and discussed.    The patient indicates understanding of these issues and agrees to the plan.  The patient is asked to f/u with PCP if sx's persist or worsen.  There are no Patient Instructions on file for this visit.

## 2024-01-15 ENCOUNTER — OFFICE VISIT (OUTPATIENT)
Dept: FAMILY MEDICINE CLINIC | Facility: CLINIC | Age: 49
End: 2024-01-15
Payer: COMMERCIAL

## 2024-01-15 ENCOUNTER — HOSPITAL ENCOUNTER (OUTPATIENT)
Dept: GENERAL RADIOLOGY | Age: 49
Discharge: HOME | End: 2024-01-15
Attending: STUDENT IN AN ORGANIZED HEALTH CARE EDUCATION/TRAINING PROGRAM
Payer: COMMERCIAL

## 2024-01-15 VITALS
HEART RATE: 87 BPM | RESPIRATION RATE: 18 BRPM | SYSTOLIC BLOOD PRESSURE: 146 MMHG | WEIGHT: 294.5 LBS | BODY MASS INDEX: 44.63 KG/M2 | OXYGEN SATURATION: 95 % | HEIGHT: 68 IN | DIASTOLIC BLOOD PRESSURE: 80 MMHG | TEMPERATURE: 97 F

## 2024-01-15 DIAGNOSIS — Z13.1 DIABETES MELLITUS SCREENING: ICD-10-CM

## 2024-01-15 DIAGNOSIS — Z13.220 SCREENING CHOLESTEROL LEVEL: ICD-10-CM

## 2024-01-15 DIAGNOSIS — J45.20 MILD INTERMITTENT REACTIVE AIRWAY DISEASE WITHOUT COMPLICATION: ICD-10-CM

## 2024-01-15 DIAGNOSIS — J45.20 MILD INTERMITTENT REACTIVE AIRWAY DISEASE WITHOUT COMPLICATION: Primary | ICD-10-CM

## 2024-01-15 PROCEDURE — 99213 OFFICE O/P EST LOW 20 MIN: CPT | Performed by: STUDENT IN AN ORGANIZED HEALTH CARE EDUCATION/TRAINING PROGRAM

## 2024-01-15 PROCEDURE — 3008F BODY MASS INDEX DOCD: CPT | Performed by: STUDENT IN AN ORGANIZED HEALTH CARE EDUCATION/TRAINING PROGRAM

## 2024-01-15 PROCEDURE — 3079F DIAST BP 80-89 MM HG: CPT | Performed by: STUDENT IN AN ORGANIZED HEALTH CARE EDUCATION/TRAINING PROGRAM

## 2024-01-15 PROCEDURE — 71046 X-RAY EXAM CHEST 2 VIEWS: CPT | Performed by: STUDENT IN AN ORGANIZED HEALTH CARE EDUCATION/TRAINING PROGRAM

## 2024-01-15 PROCEDURE — 3077F SYST BP >= 140 MM HG: CPT | Performed by: STUDENT IN AN ORGANIZED HEALTH CARE EDUCATION/TRAINING PROGRAM

## 2024-01-15 RX ORDER — ALBUTEROL SULFATE 90 UG/1
2 AEROSOL, METERED RESPIRATORY (INHALATION) EVERY 4 HOURS PRN
Qty: 6.7 G | Refills: 0 | Status: SHIPPED | OUTPATIENT
Start: 2024-01-15

## 2024-01-15 NOTE — PROGRESS NOTES
Subjective:      Chief Complaint   Patient presents with    Cough     States cold started 3 weeks ago, went to [facility] and was given steroid and cough medication but did not take the cough medication due to side effects - states did finish the steroid - noticed abdominal pain due for coughing     HISTORY OF PRESENT ILLNESS  Cough      HPI obtained per patient report.  Emeka Turpin is a pleasant 48 year old male presenting with cough.   Cough began 3 weeks ago. It was initially dry but recently became productive.  He was seen at [facility] on 1/8/24 and prescribed prednisone and benzonatate. He finished his course of prednisone but has not tried the benzonatate.  He denies any significant improvement of his symptoms since his [facility] visit.   He requests annual lab orders.    PAST PATIENT HISTORY  Past Medical History:   Diagnosis Date    Arthritis     Back problem     DJD    DDD (degenerative disc disease), lumbar 06/18/2012    GERD (gastroesophageal reflux disease)     RESOLVED WITH GLUTEN-FREE DIET    Hx of motion sickness     LPRD (laryngopharyngeal reflux disease) 02/09/2017    Obesity, unspecified     CICI (obstructive sleep apnea)     Sleep apnea     CPAP    Spondylosis 06/18/2012    Varicose veins of leg with pain 03/30/2015    Visual impairment     GLASSES     Past Surgical History:   Procedure Laterality Date    CHOLECYSTECTOMY      COLONOSCOPY  2022    ENDOVENOUS LASER, 1ST VEIN Right 11/13/2015    Procedure: ENDO LUMINAL LASER ABLATION;  Surgeon: LIONEL Lorenzana MD;  Location: [facility]    TONSILLECTOMY         CURRENT MEDICATIONS  Outpatient Medications Marked as Taking for the 1/15/24 encounter (Office Visit) with Keshav Lovelace MD   Medication Sig Dispense Refill    albuterol 108 (90 Base) MCG/ACT Inhalation Aero Soln Inhale 2 puffs into the lungs every 4 (four) hours as needed for Shortness of Breath or Wheezing. 6.7 g 0    Multiple Vitamins-Minerals (AIRBORNE OR) Take by mouth.      Cholecalciferol (VITAMIN  D-3 OR) Take by mouth.      anastrozole 1 MG Oral Tab tab TAKE 1 TABLET BY MOUTH 48 HOURS AFTER EACH TESTOSTERONE INJECTION      TESTOSTERONE TD 1 x  WEEK      Multiple Vitamins-Minerals (MULTIVITAL-M) Oral Tab Take 1 tablet by mouth daily.         HEALTH MAINTENANCE  Immunization History   Administered Date(s) Administered    >=3 YRS QUAD MULTIDOSE VIAL (77551) FLU CLINIC 09/01/2015, 09/23/2016, 10/26/2017    Covid-19 Vaccine Pfizer 30 mcg/0.3 ml 03/30/2021, 04/20/2021, 12/08/2021    FLU VAC QIV SPLIT 3 YRS AND OLDER (16193) 10/26/2017    FLUAD High Dose 65 yr and older (40001) 02/07/2022    FLULAVAL 6 months & older 0.5 ml Prefilled syringe (32963) 10/19/2020, 02/23/2023    FLUZONE 6 months and older PFS 0.5 ml (21600) 10/19/2020, 02/07/2022, 10/30/2023    TDAP 02/10/2023       ALLERGIES AND DRUG REACTIONS  No Known Allergies    Family History   Problem Relation Age of Onset    Cancer Mother         Pancreatic     Diabetes Father     Heart Disorder Father     Heart Surgery Father         CABG    Cancer Father      Social History     Socioeconomic History    Marital status:    Tobacco Use    Smoking status: Former     Types: Cigarettes    Smokeless tobacco: Never   Vaping Use    Vaping Use: Never used   Substance and Sexual Activity    Alcohol use: Not Currently    Drug use: No   Other Topics Concern    Caffeine Concern No    Exercise Yes    Seat Belt No    Special Diet No    Stress Concern No    Weight Concern Yes       Review of Systems   Respiratory:  Positive for cough.    All other systems reviewed and are negative.         Objective:      /80   Pulse 87   Temp 97 °F (36.1 °C) (Temporal)   Resp 18   Ht 5' 8\" (1.727 m)   Wt 294 lb 8 oz (133.6 kg)   SpO2 95%   BMI 44.78 kg/m²   Body mass index is 44.78 kg/m².    Physical Exam  Vitals reviewed.   Constitutional:       General: He is not in acute distress.     Appearance: He is not ill-appearing, toxic-appearing or diaphoretic.   HENT:       Head: Normocephalic and atraumatic.   Eyes:      General: No scleral icterus.        Right eye: No discharge.         Left eye: No discharge.      Extraocular Movements: Extraocular movements intact.      Conjunctiva/sclera: Conjunctivae normal.   Cardiovascular:      Rate and Rhythm: Normal rate and regular rhythm.      Heart sounds: Normal heart sounds.   Pulmonary:      Effort: Pulmonary effort is normal.      Breath sounds: Wheezing present.      Comments: Coarse BS throughout    Abdominal:      General: There is no distension.      Palpations: Abdomen is soft. There is no mass.      Tenderness: There is no abdominal tenderness. There is no guarding or rebound.      Hernia: A hernia (umbilical hernia site soft without erythema/tenderness) is present.   Musculoskeletal:      Cervical back: Neck supple.   Skin:     General: Skin is warm and dry.      Coloration: Skin is not jaundiced or pale.      Findings: No bruising, erythema or rash.   Neurological:      Mental Status: He is alert and oriented to person, place, and time.            Assessment and Plan:      1. Mild intermittent reactive airway disease without complication (Primary)  -     XR CHEST PA + LAT CHEST (CPT=71046); Future; Expected date: 01/15/2024  -     Albuterol Sulfate HFA; Inhale 2 puffs into the lungs every 4 (four) hours as needed for Shortness of Breath or Wheezing.  Dispense: 6.7 g; Refill: 0  -     Comp Metabolic Panel (14); Future; Expected date: 01/15/2024  -     CBC With Differential With Platelet; Future; Expected date: 01/15/2024  2. Screening cholesterol level  -     Lipid Panel; Future; Expected date: 01/15/2024  3. Diabetes mellitus screening  -     Hemoglobin A1C; Future; Expected date: 01/15/2024  4. Body mass index 45.0-49.9, adult (HCC)  -     Comp Metabolic Panel (14); Future; Expected date: 01/15/2024  -     CBC With Differential With Platelet; Future; Expected date: 01/15/2024  -     Lipid Panel; Future; Expected date:  01/15/2024  -     Hemoglobin A1C; Future; Expected date: 01/15/2024    No follow-ups on file.    Cough  - recommended CXR to R/O PNA  - recommended albuterol inhaler as needed  - recommended mucinex and delsym as needed for congestion and cough, respectively    Patient verbalized understanding of assessment and recommendations. All questions and concerns were addressed.    Electronically signed by Keshav Lovelace MD

## 2024-01-16 DIAGNOSIS — I51.7 CARDIOMEGALY: Primary | ICD-10-CM

## 2024-01-16 NOTE — PROGRESS NOTES
Good morning Emeka,     Your chest x-ray did not show signs of pneumonia, which is reassuring. An antibiotic is not needed given this finding; at this time I would recommend continuing the plan of care that we discussed for your respiratory symptoms. The outline of your heart on the x-ray appeared mildly enlarged, but a chest x-ray is not the best imaging modality to evaluate the structure of the heart. I would recommend completing an echocardiogram (heart ultrasound) for further evaluation/confirmation of this finding. This has been ordered for you, so please schedule through My Chart or by phone when you can.     Dr. Lovelace

## 2024-01-17 ENCOUNTER — LAB ENCOUNTER (OUTPATIENT)
Dept: LAB | Age: 49
End: 2024-01-17
Attending: STUDENT IN AN ORGANIZED HEALTH CARE EDUCATION/TRAINING PROGRAM
Payer: COMMERCIAL

## 2024-01-17 DIAGNOSIS — Z13.1 DIABETES MELLITUS SCREENING: ICD-10-CM

## 2024-01-17 DIAGNOSIS — J45.20 MILD INTERMITTENT REACTIVE AIRWAY DISEASE WITHOUT COMPLICATION: ICD-10-CM

## 2024-01-17 DIAGNOSIS — Z13.220 SCREENING CHOLESTEROL LEVEL: ICD-10-CM

## 2024-01-17 LAB
ALBUMIN SERPL-MCNC: 3.9 G/DL (ref 3.4–5)
ALBUMIN/GLOB SERPL: 1.2 {RATIO} (ref 1–2)
ALP LIVER SERPL-CCNC: 34 U/L
ALT SERPL-CCNC: 46 U/L
ANION GAP SERPL CALC-SCNC: 2 MMOL/L (ref 0–18)
AST SERPL-CCNC: 37 U/L (ref 15–37)
BASOPHILS # BLD AUTO: 0.03 X10(3) UL (ref 0–0.2)
BASOPHILS NFR BLD AUTO: 0.5 %
BILIRUB SERPL-MCNC: 0.7 MG/DL (ref 0.1–2)
BUN BLD-MCNC: 15 MG/DL (ref 9–23)
CALCIUM BLD-MCNC: 8.8 MG/DL (ref 8.5–10.1)
CHLORIDE SERPL-SCNC: 107 MMOL/L (ref 98–112)
CHOLEST SERPL-MCNC: 147 MG/DL (ref ?–200)
CO2 SERPL-SCNC: 29 MMOL/L (ref 21–32)
CREAT BLD-MCNC: 1.18 MG/DL
EGFRCR SERPLBLD CKD-EPI 2021: 77 ML/MIN/1.73M2 (ref 60–?)
EOSINOPHIL # BLD AUTO: 0.2 X10(3) UL (ref 0–0.7)
EOSINOPHIL NFR BLD AUTO: 3.6 %
ERYTHROCYTE [DISTWIDTH] IN BLOOD BY AUTOMATED COUNT: 13.9 %
EST. AVERAGE GLUCOSE BLD GHB EST-MCNC: 108 MG/DL (ref 68–126)
FASTING PATIENT LIPID ANSWER: YES
FASTING STATUS PATIENT QL REPORTED: YES
GLOBULIN PLAS-MCNC: 3.2 G/DL (ref 2.8–4.4)
GLUCOSE BLD-MCNC: 94 MG/DL (ref 70–99)
HBA1C MFR BLD: 5.4 % (ref ?–5.7)
HCT VFR BLD AUTO: 48.4 %
HDLC SERPL-MCNC: 31 MG/DL (ref 40–59)
HGB BLD-MCNC: 15.5 G/DL
IMM GRANULOCYTES # BLD AUTO: 0.02 X10(3) UL (ref 0–1)
IMM GRANULOCYTES NFR BLD: 0.4 %
LDLC SERPL CALC-MCNC: 103 MG/DL (ref ?–100)
LYMPHOCYTES # BLD AUTO: 1.28 X10(3) UL (ref 1–4)
LYMPHOCYTES NFR BLD AUTO: 23.2 %
MCH RBC QN AUTO: 27 PG (ref 26–34)
MCHC RBC AUTO-ENTMCNC: 32 G/DL (ref 31–37)
MCV RBC AUTO: 84.3 FL
MONOCYTES # BLD AUTO: 0.59 X10(3) UL (ref 0.1–1)
MONOCYTES NFR BLD AUTO: 10.7 %
NEUTROPHILS # BLD AUTO: 3.39 X10 (3) UL (ref 1.5–7.7)
NEUTROPHILS # BLD AUTO: 3.39 X10(3) UL (ref 1.5–7.7)
NEUTROPHILS NFR BLD AUTO: 61.6 %
NONHDLC SERPL-MCNC: 116 MG/DL (ref ?–130)
OSMOLALITY SERPL CALC.SUM OF ELEC: 287 MOSM/KG (ref 275–295)
PLATELET # BLD AUTO: 287 10(3)UL (ref 150–450)
POTASSIUM SERPL-SCNC: 4.4 MMOL/L (ref 3.5–5.1)
PROT SERPL-MCNC: 7.1 G/DL (ref 6.4–8.2)
RBC # BLD AUTO: 5.74 X10(6)UL
SODIUM SERPL-SCNC: 138 MMOL/L (ref 136–145)
TRIGL SERPL-MCNC: 63 MG/DL (ref 30–149)
VLDLC SERPL CALC-MCNC: 11 MG/DL (ref 0–30)
WBC # BLD AUTO: 5.5 X10(3) UL (ref 4–11)

## 2024-01-17 PROCEDURE — 80061 LIPID PANEL: CPT

## 2024-01-17 PROCEDURE — 80053 COMPREHEN METABOLIC PANEL: CPT

## 2024-01-17 PROCEDURE — 83036 HEMOGLOBIN GLYCOSYLATED A1C: CPT

## 2024-01-17 PROCEDURE — 85025 COMPLETE CBC W/AUTO DIFF WBC: CPT

## 2024-01-17 PROCEDURE — 36415 COLL VENOUS BLD VENIPUNCTURE: CPT

## 2024-01-18 NOTE — PROGRESS NOTES
Good morning Emeka,     Your lab results were only remarkable for mildly elevated LDL cholesterol and low HDL cholesterol. I would recommend cutting back on saturated fat intake, increasing your intake of omega-3 fatty acids, and exercising regularly to improve your cholesterol levels. Your other lab results looked good. Please let me know if you have any questions.     Have a nice day,  Dr. Lovelace

## 2024-01-24 ENCOUNTER — TELEPHONE (OUTPATIENT)
Facility: LOCATION | Age: 49
End: 2024-01-24

## 2024-01-24 ENCOUNTER — HOSPITAL ENCOUNTER (OUTPATIENT)
Dept: CV DIAGNOSTICS | Facility: HOSPITAL | Age: 49
Discharge: HOME | End: 2024-01-24
Attending: STUDENT IN AN ORGANIZED HEALTH CARE EDUCATION/TRAINING PROGRAM
Payer: COMMERCIAL

## 2024-01-24 DIAGNOSIS — I51.7 CARDIOMEGALY: ICD-10-CM

## 2024-01-24 PROCEDURE — 93306 TTE W/DOPPLER COMPLETE: CPT | Performed by: STUDENT IN AN ORGANIZED HEALTH CARE EDUCATION/TRAINING PROGRAM

## 2024-01-24 NOTE — TELEPHONE ENCOUNTER
Notification or Prior Authorization is not required for the requested services    Decision ID #:[id number]    The number above acknowledges your inquiry and our response. Please write this number down and refer to it for future inquiries. Coverage and payment for an item or service is governed by the member's benefit plan document, and, if applicable, the provider's participation agreement with the Health Plan.  Expand all   Collapse all  PATIENT DETAILS  PATIENT NAME RELATIONSHIP VERBAL LANGUAGE PREFERENCE MESSAGE  Emeka Turpin Employee - A future timeline may be available for this member. For future coverage please call the telephone number located on the back of the member's Medical ID card.  MEMBER NUMBER EFFECTIVE DATE WRITTEN LANGUAGE PREFERENCE  [id number] 01/01/2020 -  GROUP NUMBER TERMINATION DATE  [id number] 12/31/9999  PRODUCT INSURANCE TYPE  POS Commercial  SUBMITTING PROVIDER DETAILS  NAME ADDRESS   Emerson Perdomo  [address] [place] [address]  [id number]  TAX ID STATUS  [id number]  In-Network  ORDERING PROVIDER DETAILS   NAME* ADDRESS  Emerson Mejia[address]  [address]  TAX ID* STATUS  [id number]  In-Network  SERVICE DETAILS  PLACE OF SERVICE   SERVICE DESCRIPTION*   Outpatient Medical Scheduled  DIAGNOSIS DETAILS  Please list the primary diagnosis code along with any other secondary codes. You may enter up to 10 diagnosis codes. If you do not list the primary diagnosis code specific to the service you are requesting, then you may receive a message in error that prior authorization is not required.    CODE DESCRIPTION    Primary K43.9 VENTRAL HERNIA W/O OBST/GANGRENE     PROCEDURE DETAILS  Please list the primary procedure code along with any other secondary codes. Primary procedure code is required. You may enter up to 15 procedure codes. Additional fields may be displayed based on the codes selected.    CODE DESCRIPTION SERVICING PROVIDER NAME,  TAX ID, STATUS,  ADDRESS    [id number] of anterior abdominal hernia(s) ( [facility],   [id number], In-Network,   [address] [place]  [address]  SERVICE DETAILS*    Medical  EXPECTED FROM DATE*   02/05/2024  EXPECTED TO DATE*   03/27/2024    COUNT*   1  STANDARD OF MEASURE*   Units  FREQUENCY*   Weekly  TOTAL*   1    REVIEW PRIORITY  Expedited Review   By checking this box and indicating that you are requesting an Expedited Review, you acknowledge that you have read and are adhering to the regulations pertaining to requesting an Expedited Review. You further acknowledge that the turnaround time for a standard request may seriously jeopardize the life or health, including mental health, of the member. Emergency services do not need prior authorization.  Medicare: 42 CFR Section 422.570  Medicaid: CFR Section 438.210  All other membership: Health Care Reform - PPACA and DOL 29 CFR [phone] AND 29 cfr 2560.503  INITIAL CONTACT DETAILS (Person submitting the notification/prior authorization)  NAME* PHONE NUMBER + EXT.* FAX NUMBER  Elly Macias  [phone]  FOLLOW-UP CONTACT DETAILS  It is important that you provide the contact information of the individual who can provide additional clinical information and assist with discharge planning activities, if applicable.    NAME* ROLE* DEPARTMENT  Elly Macias  Physician office staff/nurse -  PROVIDER PHONE NUMBER + EXT.*  FAX NUMBER*  EMAIL  [phone]

## 2024-01-25 NOTE — PROGRESS NOTES
Hi Mr. Turpin,     Your echo result (heart ultrasound) was normal, which is reassuring. At this time, I would recommend measures for overall cardiovascular health including blood pressure control. Please let me know if you have any questions.     Dr. Lovelace

## 2024-02-05 ENCOUNTER — ANESTHESIA EVENT (OUTPATIENT)
Dept: SURGERY | Facility: HOSPITAL | Age: 49
End: 2024-02-05
Payer: COMMERCIAL

## 2024-02-05 ENCOUNTER — HOSPITAL ENCOUNTER (OUTPATIENT)
Facility: HOSPITAL | Age: 49
Setting detail: HOSPITAL OUTPATIENT SURGERY
Discharge: HOME | End: 2024-02-05
Attending: SURGERY | Admitting: SURGERY
Payer: COMMERCIAL

## 2024-02-05 ENCOUNTER — ANESTHESIA (OUTPATIENT)
Dept: SURGERY | Facility: HOSPITAL | Age: 49
End: 2024-02-05
Payer: COMMERCIAL

## 2024-02-05 VITALS
HEART RATE: 81 BPM | WEIGHT: 299 LBS | DIASTOLIC BLOOD PRESSURE: 81 MMHG | SYSTOLIC BLOOD PRESSURE: 118 MMHG | OXYGEN SATURATION: 98 % | TEMPERATURE: 98 F | HEIGHT: 68 IN | BODY MASS INDEX: 45.31 KG/M2 | RESPIRATION RATE: 16 BRPM

## 2024-02-05 DIAGNOSIS — K43.9 VENTRAL HERNIA WITHOUT OBSTRUCTION OR GANGRENE: Primary | ICD-10-CM

## 2024-02-05 PROCEDURE — 49592 RPR AA HRN 1ST < 3 NCR/STRN: CPT | Performed by: SURGERY

## 2024-02-05 PROCEDURE — 8E0W4CZ ROBOTIC ASSISTED PROCEDURE OF TRUNK REGION, PERCUTANEOUS ENDOSCOPIC APPROACH: ICD-10-PCS | Performed by: SURGERY

## 2024-02-05 PROCEDURE — 76942 ECHO GUIDE FOR BIOPSY: CPT | Performed by: ANESTHESIOLOGY

## 2024-02-05 PROCEDURE — 0WUF4JZ SUPPLEMENT ABDOMINAL WALL WITH SYNTHETIC SUBSTITUTE, PERCUTANEOUS ENDOSCOPIC APPROACH: ICD-10-PCS | Performed by: SURGERY

## 2024-02-05 PROCEDURE — 49592 RPR AA HRN 1ST < 3 NCR/STRN: CPT

## 2024-02-05 DEVICE — SYNECOR SURG MESH CIRCLE 12CM: Type: IMPLANTABLE DEVICE | Site: ABDOMEN | Status: FUNCTIONAL

## 2024-02-05 RX ORDER — ACETAMINOPHEN 500 MG
1000 TABLET ORAL ONCE
Status: DISCONTINUED | OUTPATIENT
Start: 2024-02-05 | End: 2024-02-05 | Stop reason: HOSPADM

## 2024-02-05 RX ORDER — OXYCODONE HYDROCHLORIDE 5 MG/1
5 TABLET ORAL EVERY 6 HOURS PRN
Qty: 20 TABLET | Refills: 0 | Status: SHIPPED | OUTPATIENT
Start: 2024-02-05

## 2024-02-05 RX ORDER — HYDROMORPHONE HYDROCHLORIDE 1 MG/ML
0.4 INJECTION, SOLUTION INTRAMUSCULAR; INTRAVENOUS; SUBCUTANEOUS EVERY 5 MIN PRN
Status: DISCONTINUED | OUTPATIENT
Start: 2024-02-05 | End: 2024-02-05

## 2024-02-05 RX ORDER — BUPIVACAINE HYDROCHLORIDE 2.5 MG/ML
INJECTION, SOLUTION EPIDURAL; INFILTRATION; INTRACAUDAL AS NEEDED
Status: DISCONTINUED | OUTPATIENT
Start: 2024-02-05 | End: 2024-02-05

## 2024-02-05 RX ORDER — SODIUM CHLORIDE, SODIUM LACTATE, POTASSIUM CHLORIDE, CALCIUM CHLORIDE 600; 310; 30; 20 MG/100ML; MG/100ML; MG/100ML; MG/100ML
INJECTION, SOLUTION INTRAVENOUS CONTINUOUS
Status: DISCONTINUED | OUTPATIENT
Start: 2024-02-05 | End: 2024-02-05

## 2024-02-05 RX ORDER — DEXAMETHASONE SODIUM PHOSPHATE 4 MG/ML
VIAL (ML) INJECTION AS NEEDED
Status: DISCONTINUED | OUTPATIENT
Start: 2024-02-05 | End: 2024-02-05 | Stop reason: SURG

## 2024-02-05 RX ORDER — SCOLOPAMINE TRANSDERMAL SYSTEM 1 MG/1
1 PATCH, EXTENDED RELEASE TRANSDERMAL ONCE
Status: DISCONTINUED | OUTPATIENT
Start: 2024-02-05 | End: 2024-02-05 | Stop reason: HOSPADM

## 2024-02-05 RX ORDER — SENNA AND DOCUSATE SODIUM 50; 8.6 MG/1; MG/1
1 TABLET, FILM COATED ORAL DAILY
Qty: 30 TABLET | Refills: 0 | Status: SHIPPED | OUTPATIENT
Start: 2024-02-05

## 2024-02-05 RX ORDER — NALOXONE HYDROCHLORIDE 0.4 MG/ML
0.08 INJECTION, SOLUTION INTRAMUSCULAR; INTRAVENOUS; SUBCUTANEOUS AS NEEDED
Status: DISCONTINUED | OUTPATIENT
Start: 2024-02-05 | End: 2024-02-05

## 2024-02-05 RX ORDER — BUPIVACAINE HYDROCHLORIDE AND EPINEPHRINE 2.5; 5 MG/ML; UG/ML
INJECTION, SOLUTION EPIDURAL; INFILTRATION; INTRACAUDAL; PERINEURAL AS NEEDED
Status: DISCONTINUED | OUTPATIENT
Start: 2024-02-05 | End: 2024-02-05 | Stop reason: SURG

## 2024-02-05 RX ORDER — HEPARIN SODIUM 5000 [USP'U]/ML
5000 INJECTION, SOLUTION INTRAVENOUS; SUBCUTANEOUS ONCE
Status: COMPLETED | OUTPATIENT
Start: 2024-02-05 | End: 2024-02-05

## 2024-02-05 RX ORDER — LABETALOL HYDROCHLORIDE 5 MG/ML
5 INJECTION, SOLUTION INTRAVENOUS EVERY 5 MIN PRN
Status: DISCONTINUED | OUTPATIENT
Start: 2024-02-05 | End: 2024-02-05

## 2024-02-05 RX ORDER — HYDROMORPHONE HYDROCHLORIDE 1 MG/ML
0.6 INJECTION, SOLUTION INTRAMUSCULAR; INTRAVENOUS; SUBCUTANEOUS EVERY 5 MIN PRN
Status: DISCONTINUED | OUTPATIENT
Start: 2024-02-05 | End: 2024-02-05

## 2024-02-05 RX ORDER — KETOROLAC TROMETHAMINE 30 MG/ML
INJECTION, SOLUTION INTRAMUSCULAR; INTRAVENOUS AS NEEDED
Status: DISCONTINUED | OUTPATIENT
Start: 2024-02-05 | End: 2024-02-05 | Stop reason: SURG

## 2024-02-05 RX ORDER — ONDANSETRON 2 MG/ML
4 INJECTION INTRAMUSCULAR; INTRAVENOUS EVERY 6 HOURS PRN
Status: DISCONTINUED | OUTPATIENT
Start: 2024-02-05 | End: 2024-02-05

## 2024-02-05 RX ORDER — HYDROCODONE BITARTRATE AND ACETAMINOPHEN 5; 325 MG/1; MG/1
2 TABLET ORAL ONCE AS NEEDED
Status: DISCONTINUED | OUTPATIENT
Start: 2024-02-05 | End: 2024-02-05

## 2024-02-05 RX ORDER — ROCURONIUM BROMIDE 10 MG/ML
INJECTION, SOLUTION INTRAVENOUS AS NEEDED
Status: DISCONTINUED | OUTPATIENT
Start: 2024-02-05 | End: 2024-02-05 | Stop reason: SURG

## 2024-02-05 RX ORDER — LIDOCAINE HYDROCHLORIDE 10 MG/ML
INJECTION, SOLUTION EPIDURAL; INFILTRATION; INTRACAUDAL; PERINEURAL AS NEEDED
Status: DISCONTINUED | OUTPATIENT
Start: 2024-02-05 | End: 2024-02-05 | Stop reason: SURG

## 2024-02-05 RX ORDER — HYDROCODONE BITARTRATE AND ACETAMINOPHEN 5; 325 MG/1; MG/1
1 TABLET ORAL ONCE AS NEEDED
Status: DISCONTINUED | OUTPATIENT
Start: 2024-02-05 | End: 2024-02-05

## 2024-02-05 RX ORDER — ALBUTEROL SULFATE 2.5 MG/3ML
2.5 SOLUTION RESPIRATORY (INHALATION) AS NEEDED
Status: DISCONTINUED | OUTPATIENT
Start: 2024-02-05 | End: 2024-02-05

## 2024-02-05 RX ORDER — CEFAZOLIN SODIUM IN 0.9 % NACL 3 G/100 ML
3 INTRAVENOUS SOLUTION, PIGGYBACK (ML) INTRAVENOUS ONCE
Status: COMPLETED | OUTPATIENT
Start: 2024-02-05 | End: 2024-02-05

## 2024-02-05 RX ORDER — ACETAMINOPHEN 500 MG
1000 TABLET ORAL ONCE AS NEEDED
Status: DISCONTINUED | OUTPATIENT
Start: 2024-02-05 | End: 2024-02-05

## 2024-02-05 RX ORDER — DEXAMETHASONE SODIUM PHOSPHATE 10 MG/ML
INJECTION, SOLUTION INTRAMUSCULAR; INTRAVENOUS AS NEEDED
Status: DISCONTINUED | OUTPATIENT
Start: 2024-02-05 | End: 2024-02-05 | Stop reason: SURG

## 2024-02-05 RX ORDER — ONDANSETRON 2 MG/ML
INJECTION INTRAMUSCULAR; INTRAVENOUS AS NEEDED
Status: DISCONTINUED | OUTPATIENT
Start: 2024-02-05 | End: 2024-02-05 | Stop reason: SURG

## 2024-02-05 RX ORDER — HYDROMORPHONE HYDROCHLORIDE 1 MG/ML
INJECTION, SOLUTION INTRAMUSCULAR; INTRAVENOUS; SUBCUTANEOUS
Status: COMPLETED
Start: 2024-02-05 | End: 2024-02-05

## 2024-02-05 RX ORDER — HYDROMORPHONE HYDROCHLORIDE 1 MG/ML
0.2 INJECTION, SOLUTION INTRAMUSCULAR; INTRAVENOUS; SUBCUTANEOUS EVERY 5 MIN PRN
Status: DISCONTINUED | OUTPATIENT
Start: 2024-02-05 | End: 2024-02-05

## 2024-02-05 RX ADMIN — DEXAMETHASONE SODIUM PHOSPHATE 2 MG: 10 INJECTION, SOLUTION INTRAMUSCULAR; INTRAVENOUS at 09:55:00

## 2024-02-05 RX ADMIN — KETOROLAC TROMETHAMINE 30 MG: 30 INJECTION, SOLUTION INTRAMUSCULAR; INTRAVENOUS at 11:06:00

## 2024-02-05 RX ADMIN — ROCURONIUM BROMIDE 10 MG: 10 INJECTION, SOLUTION INTRAVENOUS at 10:38:00

## 2024-02-05 RX ADMIN — ONDANSETRON 4 MG: 2 INJECTION INTRAMUSCULAR; INTRAVENOUS at 11:06:00

## 2024-02-05 RX ADMIN — BUPIVACAINE HYDROCHLORIDE AND EPINEPHRINE 30 ML: 2.5; 5 INJECTION, SOLUTION EPIDURAL; INFILTRATION; INTRACAUDAL; PERINEURAL at 09:55:00

## 2024-02-05 RX ADMIN — CEFAZOLIN SODIUM IN 0.9 % NACL 3 G: 3 G/100 ML INTRAVENOUS SOLUTION, PIGGYBACK (ML) INTRAVENOUS at 10:10:00

## 2024-02-05 RX ADMIN — BUPIVACAINE HYDROCHLORIDE AND EPINEPHRINE 30 ML: 2.5; 5 INJECTION, SOLUTION EPIDURAL; INFILTRATION; INTRACAUDAL; PERINEURAL at 09:59:00

## 2024-02-05 RX ADMIN — DEXAMETHASONE SODIUM PHOSPHATE 2 MG: 10 INJECTION, SOLUTION INTRAMUSCULAR; INTRAVENOUS at 09:59:00

## 2024-02-05 RX ADMIN — SODIUM CHLORIDE, SODIUM LACTATE, POTASSIUM CHLORIDE, CALCIUM CHLORIDE: 600; 310; 30; 20 INJECTION, SOLUTION INTRAVENOUS at 11:32:00

## 2024-02-05 RX ADMIN — LIDOCAINE HYDROCHLORIDE 100 MG: 10 INJECTION, SOLUTION EPIDURAL; INFILTRATION; INTRACAUDAL; PERINEURAL at 09:43:00

## 2024-02-05 RX ADMIN — ROCURONIUM BROMIDE 70 MG: 10 INJECTION, SOLUTION INTRAVENOUS at 09:43:00

## 2024-02-05 RX ADMIN — DEXAMETHASONE SODIUM PHOSPHATE 4 MG: 4 MG/ML VIAL (ML) INJECTION at 10:18:00

## 2024-02-05 NOTE — ANESTHESIA PREPROCEDURE EVALUATION
PRE-OP EVALUATION    Patient Name: Emeka Turpin    Admit Diagnosis: Ventral hernia without obstruction or gangrene [K43.9]    Pre-op Diagnosis: Ventral hernia without obstruction or gangrene [K43.9]    ROBOTIC LAPAROSCOPIC VENTRAL HERNIA REPAIR WITH MESH    Anesthesia Procedure: ROBOTIC LAPAROSCOPIC VENTRAL HERNIA REPAIR WITH MESH    Surgeon(s) and Role:     * Emerson Perdomo MD - Primary    Pre-op vitals reviewed.  Temp: 97.9 °F (36.6 °C)  Pulse: 88  Resp: 16  BP: 133/94  SpO2: 98 %  Body mass index is 45.46 kg/m².    Current medications reviewed.  Hospital Medications:   acetaminophen (Tylenol Extra Strength) tab 1,000 mg  1,000 mg Oral Once    scopolamine (Transderm-Scop) 1 MG/3DAYS patch 1 patch  1 patch Transdermal Once    lactated ringers infusion   Intravenous Continuous    heparin (Porcine) 5000 UNIT/ML injection 5,000 Units  5,000 Units Subcutaneous Once    ceFAZolin (Ancef) 3 g in sodium chloride 0.9% 100mL IVPB premix  3 g Intravenous Once       Outpatient Medications:     Medications Prior to Admission   Medication Sig Dispense Refill Last Dose    Multiple Vitamins-Minerals (AIRBORNE OR) Take by mouth.       Cholecalciferol (VITAMIN D-3 OR) Take by mouth.       anastrozole 1 MG Oral Tab tab TAKE 1 TABLET BY MOUTH 48 HOURS AFTER EACH TESTOSTERONE INJECTION       TESTOSTERONE TD 1 x  WEEK       Multiple Vitamins-Minerals (MULTIVITAL-M) Oral Tab Take 1 tablet by mouth daily.       albuterol 108 (90 Base) MCG/ACT Inhalation Aero Soln Inhale 2 puffs into the lungs every 4 (four) hours as needed for Shortness of Breath or Wheezing. 6.7 g 0        Allergies: Patient has no known allergies.      Anesthesia Evaluation        Anesthetic Complications           GI/Hepatic/Renal      (+) GERD                           Cardiovascular                (+) obesity                                       Endo/Other                                  Pulmonary                    (+) sleep apnea       Neuro/Psych                                       Past Surgical History:   Procedure Laterality Date    CHOLECYSTECTOMY      COLONOSCOPY  2022    ENDOVENOUS LASER, 1ST VEIN Right 11/13/2015    Procedure: ENDO LUMINAL LASER ABLATION;  Surgeon: LIONEL Lorenzana MD;  Location: [facility]    TONSILLECTOMY       Social History     Socioeconomic History    Marital status:    Tobacco Use    Smoking status: Former     Types: Cigarettes    Smokeless tobacco: Never   Vaping Use    Vaping Use: Never used   Substance and Sexual Activity    Alcohol use: Not Currently    Drug use: No   Other Topics Concern    Caffeine Concern No    Exercise Yes    Seat Belt No    Special Diet No    Stress Concern No    Weight Concern Yes     History   Drug Use No     Available pre-op labs reviewed.  Lab Results   Component Value Date    WBC 5.5 01/17/2024    RBC 5.74 (H) 01/17/2024    HGB 15.5 01/17/2024    HCT 48.4 01/17/2024    MCV 84.3 01/17/2024    MCH 27.0 01/17/2024    MCHC 32.0 01/17/2024    RDW 13.9 01/17/2024    .0 01/17/2024     Lab Results   Component Value Date     01/17/2024    K 4.4 01/17/2024     01/17/2024    CO2 29.0 01/17/2024    BUN 15 01/17/2024    CREATSERUM 1.18 01/17/2024    GLU 94 01/17/2024    CA 8.8 01/17/2024            Airway      Mallampati: III  Mouth opening: >3 FB  TM distance: 4 - 6 cm  Neck ROM: full Cardiovascular      Rhythm: regular  Rate: normal     Dental    Dentition appears grossly intact         Pulmonary    Pulmonary exam normal.                 Other findings              ASA: 3   Plan: general  NPO status verified and patient meets guidelines.    Post-procedure pain management plan discussed with surgeon and patient.  Surgeon requests: regional block  Comment: Discussed TAP block.   Plan/risks discussed with: patient  Use of blood product(s) discussed with: patient    Consented to blood products.          Present on Admission:  **None**

## 2024-02-05 NOTE — BRIEF OP NOTE
Pre-Operative Diagnosis: Ventral hernia without obstruction or gangrene [K43.9]     Post-Operative Diagnosis: same     Procedure Performed:   ROBOTIC LAPAROSCOPIC VENTRAL HERNIA REPAIR WITH MESH    Surgeon(s) and Role:     * Emerson Perdomo MD - Primary    Assistant(s):  PA: Snehal Gomez PA-C     Surgical Findings: incarcerated ventral hernia;  4 cm diameter     Specimen: none     Estimated Blood Loss: Blood Output: 2 mL (2/5/2024 11:09 AM)      Dictation Number:        Emerson Perdomo MD  2/5/2024  11:14 AM

## 2024-02-05 NOTE — DISCHARGE INSTRUCTIONS
Home Care Instructions  Robotic Ventral Hernia  Dr Emerson Perdomo    MEDICATIONS  For post-operative pain control the medications is Oxycodone  These are narcotics and are best taken by starting with one tablet and repeating six hours as needed. If the patient does not feel they need the narcotics they shouldn’t take them. If a minor supplement is necessary in addition to the narcotics please supplement with Tylenol (acetaminophen) and/or Advil (ibuprofen) or Motrin. Please ask your surgeon before resuming blood thinners such as aspirin, plavix or coumadin. All other home medications may be resumed as scheduled.    DIET  The patient may resume a general diet immediately. This is not a good time to eat excessively. The patient should eat in moderation and stick with foods the patient feels are easy to digest. There should be no alcohol consumption in the immediate recover time period or within six hours of taking narcotics.    WOUND CARE  The top dressing may be removed the day after surgery. This includes the gauze, tape and band-aids if they are present. Do not remove the steri-strips or butterfly tapes that are white and adherent to the skin. The steri-strips will eventually peel up at the ends and at this point they may be removed. This is usually seven to ten days after surgery. The patient may shower the day after surgery. There is no need to cover the incisions and all top gauze type dressing should be removed prior to showering. Soap can get on the wounds but do not scrub over the wounds. No hair dye or chemicals of any kind should get in the wounds. Avoid tub baths for two weeks. If visible sutures or staples are present they will be removed in the office by the surgeon or nurse. Most wounds will be closed with dissolving suture underneath the skin. These sutures will dissolve on their own.    ACTIVITY  Every day the patient should be up, showered and dressed. Each day the patient should be up and around the  house. The patient should not lie in bed and should not stay in [facility]. We count on the patient being up, coughing, walking and deep breathing to avoid pneumonia and blood clots in the legs. Once a day the patient should get out of the house and go shopping, go to the mall, the hardware store, the movies or a restaurant. The patient may ride in a car but should not drive the car for at least one week. Patients should be off narcotics for at least 8 hours prior to being the . The average time off work is 10 to 14 days; most adults will be seeing the surgeon prior to returning to work. Patients may go up and down stairs and lift up to five pounds but no bending, pushing or pulling. Nothing called work or exercise until the follow up visit. No ‘stair-master’ poser walking, jogging or workout until the follow up visit. Patients should seek further activity limits at the time of their appointment. If the patient is unable to urinate and feels a painful and full bladder call us immediately.    APPOINTMENT  Please call our office today for an appointment within five to ten days of discharge Any fever greater than 100.5, chills, nausea, vomiting, or severe diarrhea please call our office. If the wound turns red, hot, swollen, becomes increasingly painful, or drains pus call us immediately at [phone]. For life threatening emergencies call [phone]. For non-emergent care please call our office after 9:30 a.m. Monday through Saturday. The number listed above is our office number, our phone automatically switches to out answering service if we are not there. Please do not use the emergency room for nonurgent care.      Thank you for entrusting me with your care.    You received a drug called Toradol which is an Anti Inflammatory at: 11:06 AM  If you are allowed to take Anti inflammatories:    Do not take any Anti Inflammatory like Motrin, Aleve or Ibuprophen until after: 5:06 PM  Please report any suspected allergic  reactions or bleeding issues to your doctor

## 2024-02-05 NOTE — OPERATIVE REPORT
OPERATIVE REPORT      PREOPERATIVE DIAGNOSIS: Incarcerated Ventral hernia.  POSTOPERATIVE DIAGNOSIS: Incarcerated Ventral hernia.  PROCEDURE PERFORMED: Robotic repair of Ventral hernia with mesh (Synecore 12 cm x 12 cm  mesh implanted).    ASSISTANT:  Ms Jason PA-C     ANESTHESIA: General endotracheal anesthesia.    Anesthesiologist.: Sarah Marks MD    BLOOD LOSS: 2 mL.    COMPLICATIONS: None apparent.    DISPOSITION: The patient was awakened from anesthesia and he was brought to the recovery room in stable condition. The patient tolerated the procedure without apparent complication. Needle, sponge, instrument counts were correct at the end the procedure. Please note, preprocedure antibiotic and DVT prophylaxis administered per protocol, and a time-out was performed prior to initiating the procedure identifying the correct patient, procedure, surgeon and site of surgery. I indicated the site of surgery in the preoperative holding area and the patient concurred.    INDICATIONS: Please review the preprocedure history and physical. Briefly, the patient is a 48 year old male who presents with a symptomatic ventral hernia.  Risks, benefits, and alternatives of robotic herniorrhaphy are explained to the patient in detail. The risks explained included but were not limited to bleeding, infection, recurrence, injury to adjacent organs and structures, wound complications including hematoma, seroma, infection, mesh infection requiring removal, inability to complete the operation robotically or laparoscopically with a need for open procedure, as well as the need for further therapeutic diagnostic or surgical intervention. Patient voiced understanding. All his pertinent questions were answered to his satisfaction after which he provided willing and informed consent to proceed with surgery.    FINDINGS:  Incarcerated Ventral hernia with omentum in the sac.     PROCEDURE: The patient was brought to the operating room  and being appropriately positioned and after the and induction of general endotracheal anesthesia, the abdomen was prepped and draped in the usual sterile fashion. In the right lateral abdomen just lateral to the umbilicus an 8 mm incision was created, and pneumoperitoneum was then established in the usual manner.  An 8 mm Robotic Visiport trocar is used to gain entry into the abdomen under direct vision.  Diagnostic survey with the laparoscope reveals the presence of incarcerated omentum within the hernia. No other acute pathology is identified. Next, two 8 mm trocars were placed in the midclavicular line; one cephalad and one caudad to the camera port. These trocars were placed under direct vision with care not to injure underlying organs or structures.   Next, the robot was docked in usual manner and instruments were placed under direct vision. The robotic camera was then secured into position. At this point, robotic dissection was initiated. The omentum which is incarcerated in the hernia is retracted and excised from the hernia sac using sharp and blunt dissection with occasional use of electrocautery to facilitate dissection. Once the hernia is completely reduced, an 0 V-Loc suture is placed under direct vision into the abdomen. The pneumoperitoneum is reduced to 10 mmHg, and the hernia defect is primarily sutured with the running V-Loc stitch with good approximation. Next, an appropriately sized mesh is placed into the abdomen under direct vision. Next, the mesh is secured into place with a running 2-0 V-Loc suture circumferentially securing the mesh at its edges.  Once this was accomplished, the mesh was noted to sit in appropriate position with excellent repair of the hernia by primary reapproximation of the hernia edges and an intraperitoneal onlay of mesh. At this point, all instruments and needles were withdrawn. Needle, sponge, and instrument counts were correct at the end of the procedure.  Pneumoperitoneum was released after the trocars were removed under vision.  All wounds were cleansed and irrigated and the skin is reapproximated using 4-0 Monocryl in subcuticular fashion. Local anesthetic was injected at all incision sites which were then covered with Dermabond. The patient was awakened from anesthesia, brought to the recovery room in stable condition. The patient tolerated the procedure without apparent complication. Needle, sponge, instrument counts were correct at the end of procedure. Operative findings were discussed with the patient's family immediately upon conclusion of surgery.        Emerson Perdomo MD FACS  Trauma Medical Director, [facility] General Surgery    The 21st Century Cures Act makes medical notes like these available to patients in the interest of transparency. Please be advised this is a medical document. Medical documents are intended to carry relevant information, facts as evident, and the clinical opinion of the practitioner. The medical note is intended as peer to peer communication and may appear blunt or direct. It is written in medical language and may contain abbreviations or verbiage that are unfamiliar.    This note was prepared using Dragon Medical voice recognition dictation software. As a result, errors may occur. When identified, these errors have been corrected. While every attempt is made to correct errors during dictation, discrepancies may still exist.

## 2024-02-05 NOTE — ANESTHESIA PROCEDURE NOTES
Airway  Date/Time: 2/5/2024 9:45 AM  Urgency: elective      General Information and Staff    Patient location during procedure: OR  Anesthesiologist: Sarah Marks MD  Performed: anesthesiologist   Performed by: Sarah Marks MD  Authorized by: Sarah Marks MD      Indications and Patient Condition  Indications for airway management: anesthesia  Sedation level: deep  Preoxygenated: yes  Patient position: sniffing  Mask difficulty assessment: 1 - vent by mask    Final Airway Details  Final airway type: endotracheal airway      Successful airway: ETT  Cuffed: yes   Successful intubation technique: direct laryngoscopy  Endotracheal tube insertion site: oral  Blade: Jyotsna  Blade size: #4  ETT size (mm): 7.5    Cormack-Lehane Classification: grade IIA - partial view of glottis  Placement verified by: capnometry   Measured from: lips  ETT to lips (cm): 24  Number of attempts at approach: 1

## 2024-02-05 NOTE — ANESTHESIA POSTPROCEDURE EVALUATION
[facility]    [facility]   Age/Gender 48 year old male MRN [id number]   Location [facility] SURGERY Attending Emerson Perdomo MD   Hosp Day # 0 PCP Keshav Lovelace MD       Anesthesia Post-op Note    ROBOTIC LAPAROSCOPIC VENTRAL HERNIA REPAIR WITH MESH    Procedure Summary       Date: 02/05/24 Room / Location:  MAIN OR 09 / [facility] MAIN OR    Anesthesia Start: 0938 Anesthesia Stop: 1135    Procedure: ROBOTIC LAPAROSCOPIC VENTRAL HERNIA REPAIR WITH MESH Diagnosis:       Ventral hernia without obstruction or gangrene      (Ventral hernia without obstruction or gangrene [K43.9])    Surgeons: Emerson Perdomo MD Anesthesiologist: Sarah Marks MD    Anesthesia Type: general ASA Status: 3            Anesthesia Type: general    Vitals Value Taken Time   /85 02/05/24 1137   Temp 97.8 02/05/24 1137   Pulse 86 02/05/24 1137   Resp 18 02/05/24 1137   SpO2 94 02/05/24 1137       Patient Location: PACU    Anesthesia Type: general    Airway Patency: patent, extubated and oral/nasal airway    Postop Pain Control: adequate    Nausea/Vomiting: none    Cardiopulmonary/Hydration status: stable euvolemic    Complications: no apparent anesthesia related complications    Postop vital signs: stable    Dental Exam: Unchanged from Preop    Patient to be discharged from PACU when criteria met.

## 2024-02-05 NOTE — ANESTHESIA PROCEDURE NOTES
Regional Block    Date/Time: 2/5/2024 9:52 AM    Performed by: Sarah Marks MD  Authorized by: Sarah Marks MD      General Information and Staff    Start Time:  2/5/2024 9:52 AM  End Time:  2/5/2024 10:00 AM  Anesthesiologist:  Sarah Marks MD  Performed by:  Anesthesiologist  Patient Location:  ED      Site Identification: real time ultrasound guided and image stored and retrievable    Block site/laterality marked before start: site marked  Reason for Block: procedure for pain and at request of ED Physician    Preanesthetic Checklist: 2 patient identifers, IV checked, risks and benefits discussed, monitors and equipment checked, pre-op evaluation, timeout performed, anesthesia consent, sterile technique used, no prohibitive neurological deficits and no local skin infection at insertion site      Procedure Details    Patient Position:  Supine  Prep: ChloraPrep    Monitoring:  Cardiac monitor, continuous pulse ox and blood pressure cuff  Block Type:  TAP  Laterality:  Bilateral  Injection Technique:  Single-shot    Needle    Needle Type:  Short-bevel and echogenic  Needle Gauge:  21 G  Needle Length:  100 mm  Needle Localization:  Ultrasound guidance  Reason for Ultrasound Use: appropriate spread of the medication was noted in real time and no ultrasound evidence of intravascular and/or intraneural injection            Assessment    Injection Assessment:  Good spread noted, negative aspiration for heme, negative resistance, no pain on injection and incremental injection  Heart Rate Change: No    - Patient tolerated block procedure well without evidence of immediate block related complications.     Medications  2/5/2024 9:52 AM      Additional Comments    Medication:  Bupivacaine 0.25% 30mL  with 1:200,000 epi with 2 mg of PF dexamethasone at each side.

## 2024-02-05 NOTE — H&P
New Patient Visit Note         History of Present Illness     Patient presents at the request of his primary care physician for evaluation of abdominal wall hernia.  Patient states the hernia has been present for over 2 years but he does not had any difficulty or issues so she did not seek immediate attention.  A CT scan performed on April 2022 demonstrates a supraumbilical hernia with incarcerated fat.  I reviewed the images and discussed findings with the patient.  I concur with radiologist interpretation.  Recently the patient states the once again saw his primary care physician and he was advised to follow-up with with me for surgical recommendations.    The patient denies any pain remote from the site of the hernia bulge.  The patient had cholecystectomy many years ago he is uncertain if the bulge in question is an area of surgical incision.    The patient denies fever, chills, chest pain, shortness of breath, dyspnea. The patient also denies hematemesis, melena, or hematochezia. The patient denies change in bowel or bladder habits. There is no complaint of hematuria or dysuria.    I discussed the risk, benefits, alternatives of hernia repair with mesh.  I discussed anticipated postoperative recovery including dietary, activity, exercise and lifestyle recommendations.    Allergies  Emeka has No Known Allergies.    Past Medical / Surgical / Social / Family History    The past medical and past surgical history have been reviewed by me today.    Past Medical History:   Diagnosis Date    Arthritis     Back problem     DJD    DDD (degenerative disc disease), lumbar 06/18/2012    GERD (gastroesophageal reflux disease)     RESOLVED WITH GLUTEN-FREE DIET    Hx of motion sickness     Hypercholesteremia     LPRD (laryngopharyngeal reflux disease) 02/09/2017    Obesity, unspecified     CICI (obstructive sleep apnea)     Sleep apnea     CPAP    Spondylosis 06/18/2012    Varicose veins of leg with pain 03/30/2015    Visual  impairment     GLASSES     Past Surgical History:   Procedure Laterality Date    CHOLECYSTECTOMY      COLONOSCOPY  2022    ENDOVENOUS LASER, 1ST VEIN Right 11/13/2015    Procedure: ENDO LUMINAL LASER ABLATION;  Surgeon: LIONEL Lorenzana MD;  [facility]    TONSILLECTOMY         The family history and social history have been reviewed by me today.    Family History   Problem Relation Age of Onset    Cancer Mother         Pancreatic     Diabetes Father     Heart Disorder Father     Heart Surgery Father         CABG    Cancer Father      Social History     Socioeconomic History    Marital status:    Tobacco Use    Smoking status: Former     Types: Cigarettes    Smokeless tobacco: Never   Vaping Use    Vaping Use: Never used   Substance and Sexual Activity    Alcohol use: Not Currently    Drug use: No   Other Topics Concern    Caffeine Concern No    Exercise Yes    Seat Belt No    Special Diet No    Stress Concern No    Weight Concern Yes      No current outpatient medications on file.      Review of Systems  The Review of Systems has been reviewed by me during today.  Review of Systems   Constitutional:  Negative for chills, diaphoresis, fatigue, fever and unexpected weight change.   HENT:  Negative for hearing loss, nosebleeds, sore throat and trouble swallowing.    Respiratory:  Negative for apnea, cough, shortness of breath and wheezing.    Cardiovascular:  Negative for chest pain, palpitations and leg swelling.   Gastrointestinal:  Negative for abdominal distention, abdominal pain, anal bleeding, blood in stool, constipation, diarrhea, nausea and vomiting.   Genitourinary:  Negative for difficulty urinating, dysuria, frequency and urgency.   Musculoskeletal:  Negative for arthralgias and myalgias.   Skin:  Negative for color change and rash.   Neurological:  Negative for tremors, syncope and weakness.   Hematological:  Negative for adenopathy. Does not bruise/bleed easily.    Psychiatric/Behavioral:  Negative for behavioral problems and sleep disturbance.        Physical Findings   BP (!) 133/94 (BP Location: Right arm)   Pulse 88   Temp 97.9 °F (36.6 °C) (Temporal)   Resp 16   Ht 68\"   Wt 299 lb (135.6 kg)   SpO2 98%   BMI 45.46 kg/m²   Physical Exam  Vitals and nursing note reviewed.   Constitutional:       General: He is not in acute distress.     Appearance: He is well-developed. He is obese.   HENT:      Head: Normocephalic and atraumatic.   Eyes:      General: No scleral icterus.     Pupils: Pupils are equal, round, and reactive to light.   Neck:      Vascular: No JVD.      Trachea: No tracheal deviation.   Cardiovascular:      Rate and Rhythm: Normal rate and regular rhythm.   Pulmonary:      Effort: Pulmonary effort is normal. No respiratory distress.      Breath sounds: No stridor.   Abdominal:      General: Bowel sounds are normal. There is no distension.      Palpations: Abdomen is soft. Abdomen is not rigid. There is no mass.      Tenderness: There is no abdominal tenderness. There is no guarding or rebound. Negative signs include Duncan's sign and McBurney's sign.      Hernia: A hernia is present. Hernia is present in the ventral area.       Musculoskeletal:         General: Normal range of motion.      Cervical back: Normal range of motion and neck supple.   Skin:     General: Skin is warm and dry.   Neurological:      Mental Status: He is alert and oriented to person, place, and time.   Psychiatric:         Behavior: Behavior normal.             Assessment     Incarcerated ventral hernia        Plan     The patient will be scheduled for robotic repair of incarcerated ventral hernia with mesh.    The hal-operative care plan was discussed with the patient, who voices understanding.  Activity and lifting recommendations were discussed in length.     The risks, benefits, and alternatives to the procedure were explained to the patient.  The risks explained include,  but are not limited to, bleeding, infection, pain wound complications, recurrence, incorrect diagnosis, injury to adjacent organs and structures. We also discussed the possibile need for further therapeutic, diagnostic, or surgical intervention.  The patient voiced understanding, and after all questions were answered to the patient's satisfaction, the patient provided willing and informed consent to proceed.         Emerson Perdomo MD FACS  Trauma Medical Director, [facility] General Surgery    The 21st Century Cures Act makes medical notes like these available to patients in the interest of transparency. Please be advised this is a medical document. Medical documents are intended to carry relevant information, facts as evident, and the clinical opinion of the practitioner. The medical note is intended as peer to peer communication and may appear blunt or direct. It is written in medical language and may contain abbreviations or verbiage that are unfamiliar.    This note was prepared using Dragon Medical voice recognition dictation software. As a result, errors may occur. When identified, these errors have been corrected. While every attempt is made to correct errors during dictation, discrepancies may still exist.

## 2024-02-20 ENCOUNTER — PATIENT MESSAGE (OUTPATIENT)
Dept: FAMILY MEDICINE CLINIC | Facility: CLINIC | Age: 49
End: 2024-02-20

## 2024-02-20 NOTE — TELEPHONE ENCOUNTER
From: Emeka Turpin  To: Anne-Mariekayceeswapnil Lovelace  Sent: 2/20/2024 6:42 AM CST  Subject: Medicine request    My son was just at urgent care and has been diagnosed with pink eye. I feel my eyes getting itchy with some red in them. Can I get prescribed the drops as well or do I need to get into eyes checked first.    Thanks

## 2024-02-22 ENCOUNTER — OFFICE VISIT (OUTPATIENT)
Facility: LOCATION | Age: 49
End: 2024-02-22
Payer: COMMERCIAL

## 2024-02-22 VITALS — HEART RATE: 98 BPM | TEMPERATURE: 98 F

## 2024-02-22 DIAGNOSIS — K43.9 VENTRAL HERNIA WITHOUT OBSTRUCTION OR GANGRENE: Primary | ICD-10-CM

## 2024-02-22 PROCEDURE — 99212 OFFICE O/P EST SF 10 MIN: CPT | Performed by: SURGERY

## 2024-02-22 NOTE — PROGRESS NOTES
Post Operative Visit Note       Active Problems  1. Ventral hernia without obstruction or gangrene         Chief Complaint   Chief Complaint   Patient presents with   • Post-Op     PO - ROBOTIC LAPAROSCOPIC VENTRAL HERNIA REPAIR WITH MESH-2/5 - Pt denies any f/n/v, Pt denies any redness or swelling of incisions           History of Present Illness         Allergies  Emeka has No Known Allergies.    Past Medical / Surgical / Social / Family History    The past medical and past surgical history have been reviewed by me today.     Past Medical History:   Diagnosis Date   • Arthritis    • Back problem     DJD   • DDD (degenerative disc disease), lumbar 06/18/2012   • GERD (gastroesophageal reflux disease)     RESOLVED WITH GLUTEN-FREE DIET   • Hx of motion sickness    • Hypercholesteremia    • LPRD (laryngopharyngeal reflux disease) 02/09/2017   • Obesity, unspecified    • CICI (obstructive sleep apnea)    • Sleep apnea     CPAP   • Spondylosis 06/18/2012   • Varicose veins of leg with pain 03/30/2015   • Visual impairment     GLASSES     Past Surgical History:   Procedure Laterality Date   • CHOLECYSTECTOMY     • COLONOSCOPY  2022   • ENDOVENOUS LASER, 1ST VEIN Right 11/13/2015    Procedure: ENDO LUMINAL LASER ABLATION;  Surgeon: LIONEL Lorenzana MD;  Location: [facility]   • TONSILLECTOMY         The family history and social history have been reviewed by me today.    Family History   Problem Relation Age of Onset   • Cancer Mother         Pancreatic    • Diabetes Father    • Heart Disorder Father    • Heart Surgery Father         CABG   • Cancer Father      Social History     Socioeconomic History   • Marital status:    Tobacco Use   • Smoking status: Former     Types: Cigarettes   • Smokeless tobacco: Never   Vaping Use   • Vaping Use: Never used   Substance and Sexual Activity   • Alcohol use: Not Currently   • Drug use: No   Other Topics Concern   • Caffeine Concern No   • Exercise Yes   • Seat Belt No    • Special Diet No   • Stress Concern No   • Weight Concern Yes        Current Outpatient Medications:   •  senna-docusate 8.6-50 MG Oral Tab, Take 1 tablet by mouth daily., Disp: 30 tablet, Rfl: 0  •  oxyCODONE 5 MG Oral Tab, Take 1 tablet (5 mg total) by mouth every 6 (six) hours as needed for Pain., Disp: 20 tablet, Rfl: 0  •  albuterol 108 (90 Base) MCG/ACT Inhalation Aero Soln, Inhale 2 puffs into the lungs every 4 (four) hours as needed for Shortness of Breath or Wheezing., Disp: 6.7 g, Rfl: 0  •  Multiple Vitamins-Minerals (AIRBORNE OR), Take by mouth., Disp: , Rfl:   •  Cholecalciferol (VITAMIN D-3 OR), Take by mouth., Disp: , Rfl:   •  anastrozole 1 MG Oral Tab tab, TAKE 1 TABLET BY MOUTH 48 HOURS AFTER EACH TESTOSTERONE INJECTION, Disp: , Rfl:   •  TESTOSTERONE TD, 1 x  WEEK, Disp: , Rfl:   •  Multiple Vitamins-Minerals (MULTIVITAL-M) Oral Tab, Take 1 tablet by mouth daily., Disp: , Rfl:       Review of Systems  The Review of Systems has been reviewed by me during today.  Review of Systems   Constitutional:  Negative for chills, diaphoresis, fatigue, fever and unexpected weight change.   HENT:  Negative for hearing loss, nosebleeds, sore throat and trouble swallowing.    Respiratory:  Negative for apnea, cough, shortness of breath and wheezing.    Cardiovascular:  Negative for chest pain, palpitations and leg swelling.   Gastrointestinal:  Negative for abdominal distention, abdominal pain, anal bleeding, blood in stool, constipation, diarrhea, nausea and vomiting.   Genitourinary:  Negative for difficulty urinating, dysuria, frequency and urgency.   Musculoskeletal:  Negative for arthralgias and myalgias.   Skin:  Negative for color change and rash.   Neurological:  Negative for tremors, syncope and weakness.   Hematological:  Negative for adenopathy. Does not bruise/bleed easily.   Psychiatric/Behavioral:  Negative for behavioral problems and sleep disturbance.      Physical Findings   Pulse 98   Temp  98 °F (36.7 °C)   Physical Exam        Assessment   1. Ventral hernia without obstruction or gangrene          Plan            No orders of the defined types were placed in this encounter.      Imaging & Referrals   None    Follow Up  No follow-ups on file.    Emerson Perdomo MD

## 2024-02-22 NOTE — PROGRESS NOTES
Post Operative Visit Note       Active Problems  1. Ventral hernia without obstruction or gangrene         Chief Complaint   Chief Complaint   Patient presents with    Post-Op     PO - ROBOTIC LAPAROSCOPIC VENTRAL HERNIA REPAIR WITH MESH-2/5 - Pt denies any f/n/v, Pt denies any redness or swelling of incisions           History of Present Illness     Patient presents for postoperative care following robotic repair of ventral hernia with mesh.  Patient experienced initial discomfort which was managed with pain medication.  The patient now states his pain is improving steadily.  No new complaints.    Allergies  Emeka has No Known Allergies.    Past Medical / Surgical / Social / Family History    The past medical and past surgical history have been reviewed by me today.     Past Medical History:   Diagnosis Date    Arthritis     Back problem     DJD    DDD (degenerative disc disease), lumbar 06/18/2012    GERD (gastroesophageal reflux disease)     RESOLVED WITH GLUTEN-FREE DIET    Hx of motion sickness     Hypercholesteremia     LPRD (laryngopharyngeal reflux disease) 02/09/2017    Obesity, unspecified     CICI (obstructive sleep apnea)     Sleep apnea     CPAP    Spondylosis 06/18/2012    Varicose veins of leg with pain 03/30/2015    Visual impairment     GLASSES     Past Surgical History:   Procedure Laterality Date    CHOLECYSTECTOMY      COLONOSCOPY  2022    ENDOVENOUS LASER, 1ST VEIN Right 11/13/2015    Procedure: ENDO LUMINAL LASER ABLATION;  Surgeon: LIONEL Lorenzana MD;  Location: [facility]    TONSILLECTOMY         The family history and social history have been reviewed by me today.    Family History   Problem Relation Age of Onset    Cancer Mother         Pancreatic     Diabetes Father     Heart Disorder Father     Heart Surgery Father         CABG    Cancer Father      Social History     Socioeconomic History    Marital status:    Tobacco Use    Smoking status: Former     Types: Cigarettes     Smokeless tobacco: Never   Vaping Use    Vaping Use: Never used   Substance and Sexual Activity    Alcohol use: Not Currently    Drug use: No   Other Topics Concern    Caffeine Concern No    Exercise Yes    Seat Belt No    Special Diet No    Stress Concern No    Weight Concern Yes        Current Outpatient Medications:     senna-docusate 8.6-50 MG Oral Tab, Take 1 tablet by mouth daily., Disp: 30 tablet, Rfl: 0    oxyCODONE 5 MG Oral Tab, Take 1 tablet (5 mg total) by mouth every 6 (six) hours as needed for Pain., Disp: 20 tablet, Rfl: 0    albuterol 108 (90 Base) MCG/ACT Inhalation Aero Soln, Inhale 2 puffs into the lungs every 4 (four) hours as needed for Shortness of Breath or Wheezing., Disp: 6.7 g, Rfl: 0    Multiple Vitamins-Minerals (AIRBORNE OR), Take by mouth., Disp: , Rfl:     Cholecalciferol (VITAMIN D-3 OR), Take by mouth., Disp: , Rfl:     anastrozole 1 MG Oral Tab tab, TAKE 1 TABLET BY MOUTH 48 HOURS AFTER EACH TESTOSTERONE INJECTION, Disp: , Rfl:     TESTOSTERONE TD, 1 x  WEEK, Disp: , Rfl:     Multiple Vitamins-Minerals (MULTIVITAL-M) Oral Tab, Take 1 tablet by mouth daily., Disp: , Rfl:       Review of Systems  The Review of Systems has been reviewed by me during today.  Review of Systems    Physical Findings   Pulse 98   Temp 98 °F (36.7 °C)   Physical Exam  Constitutional:       Appearance: He is well-developed.   Cardiovascular:      Rate and Rhythm: Normal rate and regular rhythm.      Heart sounds: Normal heart sounds.   Pulmonary:      Effort: Pulmonary effort is normal.      Breath sounds: Normal breath sounds.   Abdominal:      General: Bowel sounds are normal. There is no distension.      Palpations: Abdomen is soft. There is no mass.      Tenderness: There is no abdominal tenderness.      Hernia: No hernia is present.       Skin:     General: Skin is warm and dry.   Neurological:      Mental Status: He is alert and oriented to person, place, and time.      Deep Tendon Reflexes: Reflexes  are normal and symmetric.             Assessment   1. Ventral hernia without obstruction or gangrene          Plan     The patient is recovering nicely following robotic repair of incarcerated ventral hernia with mesh.    The anticipated postoperative recovery was discussed with the patient in detail.    Dietary, activity, and exercise recommendations along with restrictions were discussed with the patient during today's visit.    Wound care instructions were discussed during today's visit.    The patient will return to my attention on an as needed basis.    The patient is encouraged to continue seeing the primary care physician for ongoing medical needs.    The patient was given ample opportunity to ask questions.  The patient's questions were answered in detail and to the patient's satisfaction.  The patient voiced understanding of the postoperative care plan.           No orders of the defined types were placed in this encounter.      Imaging & Referrals   None    Follow Up  No follow-ups on file.    Emerson Perdomo MD

## 2024-05-30 ENCOUNTER — OFFICE VISIT (OUTPATIENT)
Dept: FAMILY MEDICINE CLINIC | Facility: CLINIC | Age: 49
End: 2024-05-30

## 2024-05-30 VITALS
HEIGHT: 68 IN | WEIGHT: 302.13 LBS | BODY MASS INDEX: 45.79 KG/M2 | TEMPERATURE: 98 F | RESPIRATION RATE: 16 BRPM | DIASTOLIC BLOOD PRESSURE: 92 MMHG | SYSTOLIC BLOOD PRESSURE: 132 MMHG | HEART RATE: 108 BPM

## 2024-05-30 DIAGNOSIS — H69.93 DYSFUNCTION OF BOTH EUSTACHIAN TUBES: Primary | ICD-10-CM

## 2024-05-30 DIAGNOSIS — I10 PRIMARY HYPERTENSION: ICD-10-CM

## 2024-05-30 PROCEDURE — 99213 OFFICE O/P EST LOW 20 MIN: CPT | Performed by: STUDENT IN AN ORGANIZED HEALTH CARE EDUCATION/TRAINING PROGRAM

## 2024-05-30 NOTE — PROGRESS NOTES
Subjective:      Chief Complaint   Patient presents with    Ear Problem     Bilateral ears but worse in the left ear - states it feels like swimmers ears - feel pressure - states it pops but does go away - has an appt with ENT in July    Weight Problem     Would like to discuss medications - paying out of pocket for Wegovy 0.5mg     HISTORY OF PRESENT ILLNESS  HPI  HPI obtained per patient report.  Emeka Turpin is a pleasant 49 year old male presenting with ear symptoms.   He has been experiencing B/L L>R ear pressure and popping for a few weeks.   He is currently being prescribed wegovy through his endocrinology clinic. He plans to continue following with them for now but inquires about the possibility of being prescribed the 2.4 mg through our office once he reaches this maintenance dose.    PAST PATIENT HISTORY  Past Medical History:    Arthritis    Back problem    DJD    DDD (degenerative disc disease), lumbar    GERD (gastroesophageal reflux disease)    RESOLVED WITH GLUTEN-FREE DIET    Hx of motion sickness    Hypercholesteremia    LPRD (laryngopharyngeal reflux disease)    Obesity, unspecified    CICI (obstructive sleep apnea)    Sleep apnea    CPAP    Spondylosis    Varicose veins of leg with pain    Visual impairment    GLASSES     Past Surgical History:   Procedure Laterality Date    Cholecystectomy      Colonoscopy  2022    Endovenous laser, 1st vein Right 11/13/2015    Procedure: ENDO LUMINAL LASER ABLATION;  Surgeon: LIONEL Lorenzana MD;  Location: [facility]    Tonsillectomy         CURRENT MEDICATIONS  Outpatient Medications Marked as Taking for the 5/30/24 encounter (Office Visit) with Keshav Lovelace MD   Medication Sig Dispense Refill    semaglutide-weight management 0.5 MG/0.5ML Subcutaneous Solution Auto-injector Inject 0.5 mL (0.5 mg total) into the skin once a week.      Multiple Vitamins-Minerals (AIRBORNE OR) Take by mouth.      Cholecalciferol (VITAMIN D-3 OR) Take by mouth.       anastrozole 1 MG Oral Tab tab TAKE 1 TABLET BY MOUTH 48 HOURS AFTER EACH TESTOSTERONE INJECTION      TESTOSTERONE TD 1 x  WEEK      Multiple Vitamins-Minerals (MULTIVITAL-M) Oral Tab Take 1 tablet by mouth daily.         HEALTH MAINTENANCE  Immunization History   Administered Date(s) Administered    >=3 YRS QUAD MULTIDOSE VIAL (47972) FLU CLINIC 09/01/2015, 09/23/2016, 10/26/2017    Covid-19 Vaccine Pfizer 30 mcg/0.3 ml 03/30/2021, 04/20/2021, 12/08/2021    FLU VAC QIV SPLIT 3 YRS AND OLDER (65542) 10/26/2017    FLUAD High Dose 65 yr and older (71421) 02/07/2022    FLULAVAL 6 months & older 0.5 ml Prefilled syringe (17894) 10/19/2020, 02/23/2023    FLUZONE 6 months and older PFS 0.5 ml (65348) 10/19/2020, 02/07/2022, 10/30/2023    TDAP 02/10/2023       ALLERGIES AND DRUG REACTIONS  No Known Allergies    Family History   Problem Relation Age of Onset    Cancer Mother         Pancreatic     Diabetes Father     Heart Disorder Father     Heart Surgery Father         CABG    Cancer Father      Social History     Socioeconomic History    Marital status:    Tobacco Use    Smoking status: Former     Types: Cigarettes    Smokeless tobacco: Never   Vaping Use    Vaping status: Never Used   Substance and Sexual Activity    Alcohol use: Not Currently    Drug use: No   Other Topics Concern    Caffeine Concern No    Exercise Yes    Seat Belt No    Special Diet No    Stress Concern No    Weight Concern Yes       Review of Systems   All other systems reviewed and are negative.         Objective:      BP (!) 132/92   Pulse 108   Temp 97.8 °F (36.6 °C) (Temporal)   Resp 16   Ht 5' 8\" (1.727 m)   Wt (!) 302 lb 2 oz (137 kg)   BMI 45.94 kg/m²   Body mass index is 45.94 kg/m².    Physical Exam  Vitals reviewed.   Constitutional:       General: He is not in acute distress.     Appearance: He is not ill-appearing, toxic-appearing or diaphoretic.   HENT:      Head: Normocephalic and atraumatic.      Right Ear: Tympanic  membrane, ear canal and external ear normal.      Left Ear: Tympanic membrane, ear canal and external ear normal.   Cardiovascular:      Rate and Rhythm: Normal rate.   Pulmonary:      Effort: Pulmonary effort is normal.   Abdominal:      General: There is no distension.   Musculoskeletal:      Cervical back: Neck supple.      Right lower leg: No edema.      Left lower leg: No edema.   Neurological:      Mental Status: He is alert and oriented to person, place, and time.            Assessment and Plan:      1. Dysfunction of both eustachian tubes (Primary)  2. Body mass index 45.0-49.9, adult (HCC)  3. Primary hypertension    No follow-ups on file.    Eustachian tube dysfunction  - recommended flonase or nasonex nasal spray    BMI>45  - currently following with  his endocrinology clinic  - he may contact the office for maintenance dosing prescriptions for wegovy when needed    HTN  - his home BP measurements have been similarly elevated  - he is already adhering to a low sodium diet and exercising regularly  - if his BP does not improve to consistently <140/90 within the next 3 months despite weight management treatments with wegovy, we discussed that we will need to start an antihypertensive medication    Patient verbalized understanding of assessment and recommendations. All questions and concerns were addressed.    Electronically signed by Keshav Lovelace MD

## 2024-06-19 ENCOUNTER — PATIENT MESSAGE (OUTPATIENT)
Dept: FAMILY MEDICINE CLINIC | Facility: CLINIC | Age: 49
End: 2024-06-19

## 2024-06-21 NOTE — TELEPHONE ENCOUNTER
Elevated HR can be a consequence of dehydration but also a potential side effect of testosterone replacement therapy and wegovy. Recommend increasing water intake to at least 60 oz daily. If tachycardia persists, would recommend follow-up with the endo clinic he sees for the above medications

## 2024-06-21 NOTE — TELEPHONE ENCOUNTER
From: Emeka Turpin  To: Keshav Lovelace  Sent: 6/19/2024 2:10 PM CDT  Subject: Wegoovy side effects     I read that wegoovy can increase heart rate and it seems that my resting heart rate is over 100 usually around 100-110. Is there something to do for it or should I stop for now ? Let me know if you want me to make an appointment

## 2024-07-25 ENCOUNTER — OFFICE VISIT (OUTPATIENT)
Facility: CLINIC | Age: 49
End: 2024-07-25
Payer: COMMERCIAL

## 2024-07-25 VITALS
BODY MASS INDEX: 43.35 KG/M2 | HEART RATE: 100 BPM | SYSTOLIC BLOOD PRESSURE: 130 MMHG | HEIGHT: 68 IN | RESPIRATION RATE: 18 BRPM | WEIGHT: 286 LBS | DIASTOLIC BLOOD PRESSURE: 78 MMHG

## 2024-07-25 DIAGNOSIS — E78.5 HYPERLIPIDEMIA, UNSPECIFIED HYPERLIPIDEMIA TYPE: ICD-10-CM

## 2024-07-25 DIAGNOSIS — R79.89 LOW TESTOSTERONE: Chronic | ICD-10-CM

## 2024-07-25 DIAGNOSIS — Z51.81 THERAPEUTIC DRUG MONITORING: Primary | ICD-10-CM

## 2024-07-25 DIAGNOSIS — G47.33 OSA (OBSTRUCTIVE SLEEP APNEA): ICD-10-CM

## 2024-07-25 DIAGNOSIS — E66.01 OBESITY, MORBID, BMI 40.0-49.9 (HCC): ICD-10-CM

## 2024-07-25 PROCEDURE — 99214 OFFICE O/P EST MOD 30 MIN: CPT | Performed by: PHYSICIAN ASSISTANT

## 2024-07-25 RX ORDER — TIRZEPATIDE 2.5 MG/.5ML
2.5 INJECTION, SOLUTION SUBCUTANEOUS WEEKLY
Qty: 2 ML | Refills: 0 | Status: SHIPPED | OUTPATIENT
Start: 2024-07-25

## 2024-07-25 NOTE — PROGRESS NOTES
HISTORY OF PRESENT ILLNESS  Chief Complaint   Patient presents with    Weight Check     +7lbs       Emeka Turpin is a 49 year old male here for follow up in medical weight loss program.   +7lbs   Previously saw AM, telemed, 4/28/23  No AOM  Denies chest pain, shortness of breath, dizziness, blurred vision, headache, paresthesia, nausea/vomiting.   Last year started semaglutide through pharmacy  Stopped semaglutide about a month ago  Was having elevated heart rate  Started KETO  While on semaglutide helped with appetite but didn't feel like he lost weight  Has been doing KETO, has been logging foods  In the past has done calories, restriction  Exercise/Activity: 2-3 times a week - cardio and strength training, active with kids  Nutrition: 24 hour food log reviewed, eating regular meals, +protein  Stress: manageable  Sleep: CPAP compliance    Works from home [employer]    Wt Readings from Last 6 Encounters:   07/25/24 286 lb (129.7 kg)   05/30/24 (!) 302 lb 2 oz (137 kg)   02/05/24 299 lb (135.6 kg)   01/15/24 294 lb 8 oz (133.6 kg)   01/08/24 297 lb (134.7 kg)   10/30/23 299 lb 2 oz (135.7 kg)            Breakfast Lunch Dinner Snacks Fluids   Reviewed           REVIEW OF SYSTEMS  GENERAL HEALTH: feels well otherwise, denied any fevers chills or night sweats   RESPIRATORY: denies shortness of breath   CARDIOVASCULAR: denies chest pain  GI: denies abdominal pain    EXAM  /78   Pulse 100   Resp 18   Ht 5' 8\" (1.727 m)   Wt 286 lb (129.7 kg)   BMI 43.49 kg/m²   GENERAL: well developed, well nourished,in no apparent distress, A/O x3  SKIN: no rashes,no suspicious lesions  HEENT: atraumatic, normocephalic, OP-clear, PERRL  NECK: supple,no adenopathy  LUNGS: clear to auscultation bilaterally   CARDIO: RRR without murmur  GI: good BS's,NT/ND, no masses or HSM  EXTREMITIES: no cyanosis, no clubbing, no edema    Lab Results   Component Value Date    WBC 5.5 01/17/2024    RBC 5.74 (H) 01/17/2024    HGB 15.5 01/17/2024     HCT 48.4 01/17/2024    MCV 84.3 01/17/2024    MCH 27.0 01/17/2024    MCHC 32.0 01/17/2024    RDW 13.9 01/17/2024    .0 01/17/2024     Lab Results   Component Value Date    GLU 94 01/17/2024    BUN 15 01/17/2024    BUNCREA 18.4 02/14/2023    CREATSERUM 1.18 01/17/2024    ANIONGAP 2 01/17/2024    GFR 97 10/09/2017    GFRNAA 78 04/24/2022    GFRAA 90 04/24/2022    CA 8.8 01/17/2024    OSMOCALC 287 01/17/2024    ALKPHO 34 (L) 01/17/2024    AST 37 01/17/2024    ALT 46 01/17/2024    BILT 0.7 01/17/2024    TP 7.1 01/17/2024    ALB 3.9 01/17/2024    GLOBULIN 3.2 01/17/2024     01/17/2024    K 4.4 01/17/2024     01/17/2024    CO2 29.0 01/17/2024     Lab Results   Component Value Date     01/17/2024    A1C 5.4 01/17/2024     Lab Results   Component Value Date    CHOLEST 147 01/17/2024    TRIG 63 01/17/2024    HDL 31 (L) 01/17/2024     (H) 01/17/2024    VLDL 11 01/17/2024    TCHDLRATIO 3.90 10/09/2017    NONHDLC 116 01/17/2024     Lab Results   Component Value Date    TSH 1.770 02/14/2023     No results found for: \"B12\", \"VITB12\"  No results found for: \"VITD\", \"QVITD\", \"[id number]\"    Current Outpatient Medications on File Prior to Visit   Medication Sig Dispense Refill    Cholecalciferol (VITAMIN D-3 OR) Take by mouth.      TESTOSTERONE TD 1 x  WEEK      Multiple Vitamins-Minerals (MULTIVITAL-M) Oral Tab Take 1 tablet by mouth daily.      Multiple Vitamins-Minerals (AIRBORNE OR) Take by mouth. (Patient not taking: Reported on 7/25/2024)      anastrozole 1 MG Oral Tab tab TAKE 1 TABLET BY MOUTH 48 HOURS AFTER EACH TESTOSTERONE INJECTION       No current facility-administered medications on file prior to visit.       ASSESSMENT  Analyzed weight data:       Diagnoses and all orders for this visit:    Therapeutic drug monitoring  -     Tirzepatide-Weight Management (ZEPBOUND) 2.5 MG/0.5ML Subcutaneous Solution Auto-injector; Inject 2.5 mg into the skin once a week.  -     [facility] Dietician Referral   (C USE ONLY)    Obesity, morbid, BMI 40.0-49.9 ([facility])  -     Tirzepatide-Weight Management (ZEPBOUND) 2.5 MG/0.5ML Subcutaneous Solution Auto-injector; Inject 2.5 mg into the skin once a week.  -     C Dietician Referral  (C USE ONLY)    CICI (obstructive sleep apnea)    Hyperlipidemia, unspecified hyperlipidemia type    Low testosterone        PLAN  Initial Weight: 286lbs  Initial Weight Date: 7/25/24  Today's Weight: 286lbs  5% Goal: 14.3lbs  10% Goal: 28.6lbs  Total Weight Loss:     Will begin Zepbound 2.5mg weekly - denies any personal or family history of pancreatitis, pancreatic cancer, thyroid cancer, MEN2 - discussed MOA, advised side effects and adverse effects of medication.  HLD - lifestyle changes  CPAP compliance  Low testosterone - continue to f/u with specialist  Begin logging foods - discussed macronutrient goals, protein and produce  Incorporate strength/resistance training  Nutrition: low carb diet/ recommended to eat breakfast daily/ regular protein intake  Medication use and side effects reviewed with patient.  Medication contraindications: stimulant  Follow up with dietitian and psychologist as recommended.  Discussed the role of sleep and stress in weight management.  Counseled on comprehensive weight loss plan including attention to nutrition, exercise and behavior/stress management for success. See patient instruction below for more details.  Discussed strategies to overcome barriers to successful weight loss and weight maintenance  FITTE: ACSM recommendations (150-300 minutes/ week in active weight loss)   Total time spent on chart review, pre-charting, obtaining history, counseling, and educating, reviewing labs was 30 minutes.  Weight Loss consent to treat reviewed and signed       NOTE TO PATIENT: The 21st Century Cures Act makes clinical notes like these available to patients in the interest of transparency. Clinical notes are medical documents used by physicians and care providers  to communicate with each other. These documents include medical language and terminology, abbreviations, and treatment information that may sound technical and at times possibly unfamiliar. In addition, at times, the verbiage may appear blunt or direct. These documents are one tool providers use to communicate relevant information and clinical opinions of the care providers in a way that allows common understanding of the clinical context.     Patient Instructions   2000 calories a day    Moderate Carb (30/35/35)  156g  Protein    81g  Fats    182g  Carbs    Lower Carb (40/40/20)  208g  Protein    92g  Fats    104g  Carbs    We are here to support you with weight loss, but please remember that you still need your primary care provider for your routine health maintenance.      PLAN:  Will begin zepbound  Referral for dietician  Follow up with me in 3 months  Schedule follow up appointments: Chung Kumari (dietitian) or Brionna Salcedo (presurgery dietitian)   Check for insurance coverage for dietitian and labwork prior to scheduling appointment.     Please try to work on the following dietary changes:  Goals: Aim for 20-30 grams of protein/ meal  Eat 4-6 vegetables/day  Avoid skipping meals- eat every 4-5 hours  Aim for 3 meals/day  2. Drink lots of water and cut down on soda/juice consumption if soda/juice drinker  3. Focus on protein: (15-30 grams with each meal) ie. greek yogurt, cottage cheese, string cheese, hard boiled eggs  4. Healthy snacks: peanut butter and apples, hummus and carrots, berries, nuts (1/4 cup), tuna and crackers                 Protein Shakes: Premier protein or Core Power                Protein Bars: Rx Bars, Oatmega, Power Crunch                 Sargento balanced breaks (cheese and nuts)- without chocolate  5. Reduce carbohydrates which includes sweets as well as rice, pasta, potatoes, bread, corn and instead choose whole grain options or more protein or vegetables (4-6 servings of vegetables  per day)  6. Get a good night of sleep  7. Try to decrease stress in life     Please download apps:  1. My Fitness Pal, Lose it, My Net Diary bebeto to help you to monitor daily dietary intake and you will be able to see if you are eating the right amount of calories, protein, carbs                With My Fitness Pal-->When you set-up the bebeto or need to adjust settings:                Goals should include:                 Lose 1.5-2 lbs per week                Activity level: not very active (can't count exercise towards calorie number per day)                   ** Daily INPUT> Look at nutrition section-- \"nutrients\" and it will break down your macros for the day (ie. Protein, carbs, fibers, sugars and fats). Try to stay within these numbers daily     2. \"7 minute workout\" to help with exercise/activity which takes 7 minutes of your day and that you can do at home!   3. \"Calm\" or \"Headspace\" which helps with mindfulness, meditation, clarity, sleep, and yasmin to your daily life.   4. Semmle blog for healthy recipe ideas  5. Cloudscaling for low carb resources    HIGH PROTEIN SNACK IDEAS  -cottage cheese  -plain yogurt  -kefir  -hard-boiled eggs  -natural cheeses  -nuts (measure portion size)   -unsweetened nut butters  -dried edamame   -sloane seeds soaked in water or almond milk  -soy nuts  -cured meats (monitor for sodium issues)   -hummus with vegetables  -bean dip with vegetables     FRUIT  Low carb fruit options   Raspberries: Half a cup (60 grams) contains 3 grams of carbs.  Blackberries: Half a cup (70 grams) contains 4 grams of carbs.  Strawberries: Half a cup (100 grams) contains 6 grams of carbs.  Blueberries: Half a cup (50 grams) contains 6 grams of carbs.  Plum: One medium-sized (80 grams) contains 6 grams of carbs.     VEGETABLES  Low carb vegetables              Delicious and Healthy Snacks     Tomato and cheese plate--mix 1/2 cup   cherry tomatoes, 1 cup fresh mini mozzarella   balls, ½ cup olives      Cottage cheese & berries--top ½ cup of   cottage cheese with 1 cup of berries of   choice. Add one handful of pecans for some   extra protein, fat and fiber.      Apple + Greek Yogurt + Nut butter*--cut up   an apple and dip in 1/3 cup of Greek yogurt   and 2 tbsp of nut butter.     Hummus & veggies--dip baby carrots, pepper   strips, or snap peas in ¼ cup hummus.     Nuts--munch on 1oz of any kind of nut (about   ¼ cup). In the shell will help to slow down!   Carrots and cheese plate-- 1 cup of baby   carrots (or veg of choice) with ½ cup cheddar   cheese cubes and 2-4 low sodium, nitrate   free turkey slices     Yogurt & berries--mix ½-1 cup berries in a   6oz container of plain or low sugar fruit   flavored 2% Greek yogurt (less than15 grams   of sugar per serving). Chobani (Less Sugar) or   Siggis are examples.     Hard boiled egg & fruit--1 to 2 hardboiled   eggs and a tangerine or other small serving   of fruit     Tuna & avocado--put 2oz of tuna (one pouch)   on 1/3 of an avocado or 2 tablespoons   guacamole and top with salsa     String cheese & veggies--one piece cheese   (¾ oz) and 1 cup snap peas or other   vegetables     Cauliflower rice & cheese--sprinkle ¼ cup   shredded cheese on 1 cup cauliflower rice   and microwave until cheese melts     Deviled eggs--3 deviled egg halves     Bean dip & veggies- ½ cup refried beans   with ¼ cup shredded cheese melted on   top. Use as a dip for celery or red pepper   strips or eat plain.     Sargento Balanced Breaks-or make your   own with ½ oz nuts, ½ oz cheese, and 1   teaspoon dried fruit.     Edamame with fruit and nuts--1-2   mandarin orange, ¼ cup cashews, ¼ cup   edamame      Low fat chicken, egg, or tuna salad--mix   2oz chicken, tuna, or with 1 teaspoon   mayonnaise, 1 teaspoon Can mustard,   and 1 teaspoon plain yogurt. Add chopped   celery, onions, walnuts, Granny Smith   apples, or dried cranberries for extra flavor.     Marlow break--turkey,  chicken, or nut   butter on 1 slice whole grain bread.     Protein Shake--Modi, Core Power, Orgain,   Premier Protein, Fairlife     Protein Bar--Quest, Oatmega, RX Bar, [place]   Bars     Protein Chips - Legendary and Quest chips  These snacks contain protein,   fiber and fat to keep you full and   energized!   *If you have a peanut allergy, you can substitute with   Sun Butter (made from sunflower seeds) or WOW butter   (made from soy      No follow-ups on file.    Patient verbalizes understanding.    Laura James PA-C  7/25/2024

## 2024-07-25 NOTE — PATIENT INSTRUCTIONS
2000 calories a day    Moderate Carb (30/35/35)  156g  Protein    81g  Fats    182g  Carbs    Lower Carb (40/40/20)  208g  Protein    92g  Fats    104g  Carbs    We are here to support you with weight loss, but please remember that you still need your primary care provider for your routine health maintenance.      PLAN:  Will begin zepbound  Referral for dietician  Follow up with me in 3 months  Schedule follow up appointments: Chung Kumari (dietitian) or Brionna Salcedo (presurgery dietitian)   Check for insurance coverage for dietitian and labwork prior to scheduling appointment.     Please try to work on the following dietary changes:  Goals: Aim for 20-30 grams of protein/ meal  Eat 4-6 vegetables/day  Avoid skipping meals- eat every 4-5 hours  Aim for 3 meals/day  2. Drink lots of water and cut down on soda/juice consumption if soda/juice drinker  3. Focus on protein: (15-30 grams with each meal) ie. greek yogurt, cottage cheese, string cheese, hard boiled eggs  4. Healthy snacks: peanut butter and apples, hummus and carrots, berries, nuts (1/4 cup), tuna and crackers                 Protein Shakes: Premier protein or Core Power                Protein Bars: Rx Bars, Oatmega, Power Crunch                 Sargento balanced breaks (cheese and nuts)- without chocolate  5. Reduce carbohydrates which includes sweets as well as rice, pasta, potatoes, bread, corn and instead choose whole grain options or more protein or vegetables (4-6 servings of vegetables per day)  6. Get a good night of sleep  7. Try to decrease stress in life     Please download apps:  1. My Fitness Pal, Lose it, My Net Diary bebeto to help you to monitor daily dietary intake and you will be able to see if you are eating the right amount of calories, protein, carbs                With My Fitness Pal-->When you set-up the bebeto or need to adjust settings:                Goals should include:                 Lose 1.5-2 lbs per week                Activity  level: not very active (can't count exercise towards calorie number per day)                   ** Daily INPUT> Look at nutrition section-- \"nutrients\" and it will break down your macros for the day (ie. Protein, carbs, fibers, sugars and fats). Try to stay within these numbers daily     2. \"7 minute workout\" to help with exercise/activity which takes 7 minutes of your day and that you can do at home!   3. \"Calm\" or \"Headspace\" which helps with mindfulness, meditation, clarity, sleep, and yasmin to your daily life.   4. [employer] blog for healthy recipe ideas  5. [employer] for low carb resources    HIGH PROTEIN SNACK IDEAS  -cottage cheese  -plain yogurt  -kefir  -hard-boiled eggs  -natural cheeses  -nuts (measure portion size)   -unsweetened nut butters  -dried edamame   -sloane seeds soaked in water or almond milk  -soy nuts  -cured meats (monitor for sodium issues)   -hummus with vegetables  -bean dip with vegetables     FRUIT  Low carb fruit options   Raspberries: Half a cup (60 grams) contains 3 grams of carbs.  Blackberries: Half a cup (70 grams) contains 4 grams of carbs.  Strawberries: Half a cup (100 grams) contains 6 grams of carbs.  Blueberries: Half a cup (50 grams) contains 6 grams of carbs.  Plum: One medium-sized (80 grams) contains 6 grams of carbs.     VEGETABLES  Low carb vegetables              Delicious and Healthy Snacks     Tomato and cheese plate--mix 1/2 cup   cherry tomatoes, 1 cup fresh mini mozzarella   balls, ½ cup olives     Cottage cheese & berries--top ½ cup of   cottage cheese with 1 cup of berries of   choice. Add one handful of pecans for some   extra protein, fat and fiber.      Apple + Greek Yogurt + Nut butter*--cut up   an apple and dip in 1/3 cup of Greek yogurt   and 2 tbsp of nut butter.     Hummus & veggies--dip baby carrots, pepper   strips, or snap peas in ¼ cup hummus.     Nuts--munch on 1oz of any kind of nut (about   ¼ cup). In the shell will help to slow down!    Carrots and cheese plate-- 1 cup of baby   carrots (or veg of choice) with ½ cup cheddar   cheese cubes and 2-4 low sodium, nitrate   free turkey slices     Yogurt & berries--mix ½-1 cup berries in a   6oz container of plain or low sugar fruit   flavored 2% Greek yogurt (less than15 grams   of sugar per serving). Chobani (Less Sugar) or   Siggis are examples.     Hard boiled egg & fruit--1 to 2 hardboiled   eggs and a tangerine or other small serving   of fruit     Tuna & avocado--put 2oz of tuna (one pouch)   on 1/3 of an avocado or 2 tablespoons   guacamole and top with salsa     String cheese & veggies--one piece cheese   (¾ oz) and 1 cup snap peas or other   vegetables     Cauliflower rice & cheese--sprinkle ¼ cup   shredded cheese on 1 cup cauliflower rice   and microwave until cheese melts     Deviled eggs--3 deviled egg halves     Bean dip & veggies- ½ cup refried beans   with ¼ cup shredded cheese melted on   top. Use as a dip for celery or red pepper   strips or eat plain.     Sargento Balanced Breaks-or make your   own with ½ oz nuts, ½ oz cheese, and 1   teaspoon dried fruit.     Edamame with fruit and nuts--1-2   mandarin orange, ¼ cup cashews, ¼ cup   edamame      Low fat chicken, egg, or tuna salad--mix   2oz chicken, tuna, or with 1 teaspoon   mayonnaise, 1 teaspoon Can mustard,   and 1 teaspoon plain yogurt. Add chopped   celery, onions, walnuts, Granny Smith   apples, or dried cranberries for extra flavor.     Trinchera break--turkey, chicken, or nut   butter on 1 slice whole grain bread.     Protein Shake--Modi, Core Power, Orgain,   Premier Protein, Fairlife     Protein Bar--Quest, Oatmega, RX Bar, Oceola   Bars     Protein Chips - Legendary and Quest chips  These snacks contain protein,   fiber and fat to keep you full and   energized!   *If you have a peanut allergy, you can substitute with   Sun Butter (made from sunflower seeds) or WOW butter   (made from soy

## 2024-07-30 ENCOUNTER — TELEPHONE (OUTPATIENT)
Dept: INTERNAL MEDICINE CLINIC | Facility: CLINIC | Age: 49
End: 2024-07-30

## 2024-07-30 NOTE — TELEPHONE ENCOUNTER
Prior auth needs to be obtained  [facility] was sent through [facility]  Office note need to be completed

## 2024-08-07 NOTE — TELEPHONE ENCOUNTER
Zepbound approved  Emeka Papi (Alexander: [id number])  PA Case ID #: [id number]  Rx #: [id number]  Need Help? Call us at [phone]  Outcome  Approved today by [employer] 2017 NCPDP  Request Reference Number: [id number]. ZEPBOUND INJ 2.5MG is approved through 02/07/2025. Your patient may now fill this prescription and it will be covered.  Authorization Expiration Date: 2/7/2025  Drug  Zepbound 2.5MG/0.5ML pen-injectors  ePA cloud logo  Form  [employer] Electronic Prior Authorization Form (2017 NCPDP)

## 2024-09-02 DIAGNOSIS — Z51.81 THERAPEUTIC DRUG MONITORING: ICD-10-CM

## 2024-09-02 DIAGNOSIS — E66.01 OBESITY, MORBID, BMI 40.0-49.9 (HCC): ICD-10-CM

## 2024-09-02 RX ORDER — TIRZEPATIDE 2.5 MG/.5ML
2.5 INJECTION, SOLUTION SUBCUTANEOUS WEEKLY
Qty: 2 ML | Refills: 0 | Status: CANCELLED | OUTPATIENT
Start: 2024-09-02

## 2024-09-03 NOTE — TELEPHONE ENCOUNTER
Requesting   Requested Prescriptions     Pending Prescriptions Disp Refills    Tirzepatide-Weight Management (ZEPBOUND) 5 MG/0.5ML Subcutaneous Solution Auto-injector 2 mL 0     Sig: Inject 5 mg into the skin once a week for 4 doses.      LOV: 07/25/24  RTC: 3mo  Last Relevant Labs:   Filled: 07/25/24 #2ml with 0 refills    No future appointments.

## 2024-09-05 RX ORDER — TIRZEPATIDE 5 MG/.5ML
5 INJECTION, SOLUTION SUBCUTANEOUS WEEKLY
Qty: 2 ML | Refills: 0 | Status: SHIPPED | OUTPATIENT
Start: 2024-09-05 | End: 2024-09-27

## 2024-10-02 ENCOUNTER — PATIENT MESSAGE (OUTPATIENT)
Facility: CLINIC | Age: 49
End: 2024-10-02

## 2024-10-02 DIAGNOSIS — E66.01 OBESITY, MORBID, BMI 40.0-49.9 (HCC): Primary | ICD-10-CM

## 2024-10-02 NOTE — TELEPHONE ENCOUNTER
Requesting zepbound  LOV: 7/25/24  RTC: 3 months  Last Relevant Labs: na  Filled: 9/5/24 #2ml with 0 refills    Future Appointments   Date Time Provider Department Center   10/29/2024  9:00 AM Keshav Lovelace MD [facility] [address] [facility] [address]   1/16/2025  2:40 PM Laura James PA-C [facility] [address]

## 2024-10-02 NOTE — TELEPHONE ENCOUNTER
From: Emeka Turpin  To: Laura James  Sent: 10/2/2024 7:19 AM CDT  Subject: Zepbound    Hello just had my 3rd dose and I’ve had 0 side effects. I guess this medicine is better suited as with wegoovy at the 2nd dosage I felt nauseous. Let me know if you need anything else for the next dosage level.

## 2024-10-04 RX ORDER — TIRZEPATIDE 7.5 MG/.5ML
7.5 INJECTION, SOLUTION SUBCUTANEOUS WEEKLY
Qty: 2 ML | Refills: 0 | Status: SHIPPED | OUTPATIENT
Start: 2024-10-04 | End: 2024-10-26

## 2024-10-29 ENCOUNTER — PATIENT MESSAGE (OUTPATIENT)
Facility: CLINIC | Age: 49
End: 2024-10-29

## 2024-10-29 ENCOUNTER — OFFICE VISIT (OUTPATIENT)
Dept: FAMILY MEDICINE CLINIC | Facility: CLINIC | Age: 49
End: 2024-10-29
Payer: COMMERCIAL

## 2024-10-29 VITALS
OXYGEN SATURATION: 99 % | BODY MASS INDEX: 41.43 KG/M2 | HEIGHT: 68 IN | RESPIRATION RATE: 16 BRPM | TEMPERATURE: 98 F | HEART RATE: 94 BPM | WEIGHT: 273.38 LBS | DIASTOLIC BLOOD PRESSURE: 80 MMHG | SYSTOLIC BLOOD PRESSURE: 128 MMHG

## 2024-10-29 DIAGNOSIS — Z13.31 NEGATIVE DEPRESSION SCREENING: ICD-10-CM

## 2024-10-29 DIAGNOSIS — E78.5 DYSLIPIDEMIA: ICD-10-CM

## 2024-10-29 DIAGNOSIS — Z23 INFLUENZA VACCINE NEEDED: ICD-10-CM

## 2024-10-29 DIAGNOSIS — Z00.00 WELL ADULT EXAM: Primary | ICD-10-CM

## 2024-10-29 PROCEDURE — 99396 PREV VISIT EST AGE 40-64: CPT | Performed by: STUDENT IN AN ORGANIZED HEALTH CARE EDUCATION/TRAINING PROGRAM

## 2024-10-29 PROCEDURE — 90656 IIV3 VACC NO PRSV 0.5 ML IM: CPT | Performed by: STUDENT IN AN ORGANIZED HEALTH CARE EDUCATION/TRAINING PROGRAM

## 2024-10-29 PROCEDURE — 90471 IMMUNIZATION ADMIN: CPT | Performed by: STUDENT IN AN ORGANIZED HEALTH CARE EDUCATION/TRAINING PROGRAM

## 2024-10-29 RX ORDER — TIRZEPATIDE 7.5 MG/.5ML
7.5 INJECTION, SOLUTION SUBCUTANEOUS WEEKLY
COMMUNITY
Start: 2024-10-07

## 2024-10-29 NOTE — TELEPHONE ENCOUNTER
Requesting   Requested Prescriptions     Pending Prescriptions Disp Refills    Tirzepatide-Weight Management (ZEPBOUND) 10 MG/0.5ML Subcutaneous Solution Auto-injector 2 mL 0     Sig: Inject 10 mg into the skin once a week for 4 doses.      LOV: 07/25/24  RTC: 3-6mo  Last Relevant Labs:   Filled: 10/07/24 #2ml with 0 refills (7.5mg)    Future Appointments   Date Time Provider Department Center   1/16/2025  2:40 PM Laura James PA-C [facility] [address]

## 2024-10-29 NOTE — PROGRESS NOTES
Subjective:      Chief Complaint   Patient presents with    Physical    Immunization/Injection     Flu vaccine today     HISTORY OF PRESENT ILLNESS  HPI  HPI obtained per patient report.  Emeka Turpin is a pleasant 49 year old male presenting for an annual physical.     PAST PATIENT HISTORY  Past Medical History:    Arthritis    Back problem    DJD    DDD (degenerative disc disease), lumbar    GERD (gastroesophageal reflux disease)    RESOLVED WITH GLUTEN-FREE DIET    Hx of motion sickness    Hypercholesteremia    LPRD (laryngopharyngeal reflux disease)    Obesity, unspecified    CICI (obstructive sleep apnea)    Sleep apnea    CPAP    Spondylosis    Varicose veins of leg with pain    Visual impairment    GLASSES     Past Surgical History:   Procedure Laterality Date    Cholecystectomy      Colonoscopy  2022    Endovenous laser, 1st vein Right 11/13/2015    Procedure: ENDO LUMINAL LASER ABLATION;  Surgeon: LIONEL Lorenzana MD;  Location: [facility]    Tonsillectomy         CURRENT MEDICATIONS  Medications Taking[1]    HEALTH MAINTENANCE  Immunization History   Administered Date(s) Administered    >=3 YRS QUAD MULTIDOSE VIAL (87746) FLU CLINIC 09/01/2015, 09/23/2016, 10/26/2017    Covid-19 Vaccine Pfizer 30 mcg/0.3 ml 03/30/2021, 04/20/2021, 12/08/2021    FLU VAC QIV SPLIT 3 YRS AND OLDER (51668) 10/26/2017    FLUAD High Dose 65 yr and older (01271) 02/07/2022    FLULAVAL 6 months & older 0.5 ml Prefilled syringe (54537) 10/19/2020, 02/23/2023    FLUZONE 6 months and older PFS 0.5 ml (31446) 10/19/2020, 02/07/2022, 10/30/2023    TDAP 02/10/2023   Pended Date(s) Pended    Influenza Vaccine, trivalent (IIV3), PF 0.5mL (80847) 10/29/2024       ALLERGIES AND DRUG REACTIONS  Allergies[2]    Family History   Problem Relation Age of Onset    Cancer Mother         Pancreatic     Diabetes Father     Heart Disorder Father     Heart Surgery Father         CABG    Cancer Father      Social History     Socioeconomic History     Marital status:    Tobacco Use    Smoking status: Former     Current packs/day: 0.00     Types: Cigarettes    Smokeless tobacco: Never    Tobacco comments:     Smoked briefly at 20   Vaping Use    Vaping status: Never Used   Substance and Sexual Activity    Alcohol use: Not Currently    Drug use: No   Other Topics Concern    Caffeine Concern No    Exercise Yes    Seat Belt No    Special Diet No    Stress Concern No    Weight Concern Yes       Review of Systems   All other systems reviewed and are negative.         Objective:      /80   Pulse 94   Temp 97.8 °F (36.6 °C) (Temporal)   Resp 16   Ht 5' 8\" (1.727 m)   Wt 273 lb 6 oz (124 kg)   SpO2 99%   BMI 41.57 kg/m²   Body mass index is 41.57 kg/m².    Physical Exam  Vitals reviewed.   Constitutional:       General: He is not in acute distress.     Appearance: He is not ill-appearing, toxic-appearing or diaphoretic.   HENT:      Head: Normocephalic and atraumatic.      Right Ear: Tympanic membrane, ear canal and external ear normal.      Left Ear: Tympanic membrane, ear canal and external ear normal.   Eyes:      General: No scleral icterus.        Right eye: No discharge.         Left eye: No discharge.      Extraocular Movements: Extraocular movements intact.      Conjunctiva/sclera: Conjunctivae normal.   Neck:      Thyroid: No thyroid mass, thyromegaly or thyroid tenderness.   Cardiovascular:      Rate and Rhythm: Normal rate and regular rhythm.      Heart sounds: Normal heart sounds.   Pulmonary:      Effort: Pulmonary effort is normal.      Breath sounds: Normal breath sounds.   Abdominal:      General: There is no distension.      Palpations: Abdomen is soft. There is no mass.      Tenderness: There is no abdominal tenderness. There is no guarding or rebound.   Musculoskeletal:      Cervical back: Neck supple. No tenderness.      Right lower leg: No edema.      Left lower leg: No edema.   Lymphadenopathy:      Cervical: No cervical  adenopathy.   Neurological:      Mental Status: He is alert and oriented to person, place, and time.   Psychiatric:         Mood and Affect: Mood normal.            Assessment and Plan:      1. Well adult exam (Primary)  -     CBC With Differential With Platelet; Future; Expected date: 10/29/2024  -     Comp Metabolic Panel (14); Future; Expected date: 10/29/2024  -     Lipid Panel; Future; Expected date: 10/29/2024  2. Dyslipidemia  -     Lipid Panel; Future; Expected date: 10/29/2024  3. Negative depression screening  4. Influenza vaccine needed  -     INFLUENZA VACCINE, TRI, PRESERV FREE, 0.5 ML    No follow-ups on file.    - annual lab orders to be completed in January 2025 were provided in advance today  - flu shot administered with pt's consent  - recommended continuation of healthy lifestyle modifications. He is following up with [facility]    Patient verbalized understanding of assessment and recommendations. All questions and concerns were addressed.    Electronically signed by Keshav Lovelace MD       [1]   Outpatient Medications Marked as Taking for the 10/29/24 encounter (Office Visit) with Keshav Lovelace MD   Medication Sig Dispense Refill    ZEPBOUND 7.5 MG/0.5ML Subcutaneous Solution Auto-injector Inject 7.5 mg into the skin once a week.      Multiple Vitamins-Minerals (AIRBORNE OR) Take by mouth.      Cholecalciferol (VITAMIN D-3 OR) Take by mouth.      anastrozole 1 MG Oral Tab tab TAKE 1 TABLET BY MOUTH 48 HOURS AFTER EACH TESTOSTERONE INJECTION      TESTOSTERONE TD 1 x  WEEK      Multiple Vitamins-Minerals (MULTIVITAL-M) Oral Tab Take 1 tablet by mouth daily.     [2] No Known Allergies

## 2024-11-01 ENCOUNTER — MED REC SCAN ONLY (OUTPATIENT)
Dept: FAMILY MEDICINE CLINIC | Facility: CLINIC | Age: 49
End: 2024-11-01

## 2024-11-04 RX ORDER — TIRZEPATIDE 10 MG/.5ML
10 INJECTION, SOLUTION SUBCUTANEOUS WEEKLY
Qty: 2 ML | Refills: 0 | Status: SHIPPED | OUTPATIENT
Start: 2024-11-04 | End: 2024-11-26

## 2024-11-29 ENCOUNTER — PATIENT MESSAGE (OUTPATIENT)
Facility: CLINIC | Age: 49
End: 2024-11-29

## 2024-11-29 DIAGNOSIS — E66.01 OBESITY, MORBID, BMI 40.0-49.9 (HCC): Primary | ICD-10-CM

## 2024-12-02 NOTE — TELEPHONE ENCOUNTER
Requesting zepbound  LOV: 7/25/24  RTC: 3 months  Last Relevant Labs: na  Filled: 11/4/24/ #2ml with 0 refills  10 mg    Future Appointments   Date Time Provider Department Center   1/16/2025  2:40 PM Laura James, IZZY [facility] [address]

## 2024-12-03 RX ORDER — TIRZEPATIDE 10 MG/.5ML
10 INJECTION, SOLUTION SUBCUTANEOUS WEEKLY
Qty: 2 ML | Refills: 1 | Status: SHIPPED | OUTPATIENT
Start: 2024-12-03 | End: 2024-12-25

## 2024-12-23 DIAGNOSIS — E66.01 OBESITY, MORBID, BMI 40.0-49.9 (HCC): ICD-10-CM

## 2024-12-23 RX ORDER — TIRZEPATIDE 10 MG/.5ML
10 INJECTION, SOLUTION SUBCUTANEOUS WEEKLY
Qty: 2 ML | Refills: 1 | Status: CANCELLED | OUTPATIENT
Start: 2024-12-23 | End: 2025-01-14

## 2025-01-02 ENCOUNTER — HOSPITAL ENCOUNTER (OUTPATIENT)
Dept: GENERAL RADIOLOGY | Age: 50
Discharge: HOME | End: 2025-01-02
Attending: PHYSICIAN ASSISTANT
Payer: COMMERCIAL

## 2025-01-02 ENCOUNTER — OFFICE VISIT (OUTPATIENT)
Facility: CLINIC | Age: 50
End: 2025-01-02
Payer: COMMERCIAL

## 2025-01-02 DIAGNOSIS — M22.2X2 PATELLOFEMORAL PAIN SYNDROME OF LEFT KNEE: ICD-10-CM

## 2025-01-02 DIAGNOSIS — M51.369 DEGENERATION OF INTERVERTEBRAL DISC OF LUMBAR REGION, UNSPECIFIED WHETHER PAIN PRESENT: Chronic | ICD-10-CM

## 2025-01-02 DIAGNOSIS — M51.369 DEGENERATION OF INTERVERTEBRAL DISC OF LUMBAR REGION, UNSPECIFIED WHETHER PAIN PRESENT: Primary | Chronic | ICD-10-CM

## 2025-01-02 PROCEDURE — 72110 X-RAY EXAM L-2 SPINE 4/>VWS: CPT | Performed by: PHYSICIAN ASSISTANT

## 2025-01-02 PROCEDURE — 99213 OFFICE O/P EST LOW 20 MIN: CPT | Performed by: PHYSICIAN ASSISTANT

## 2025-01-02 RX ORDER — TIRZEPATIDE 10 MG/.5ML
INJECTION, SOLUTION SUBCUTANEOUS
COMMUNITY

## 2025-01-02 NOTE — H&P
[facility] New Patient Note    CC:   Chief Complaint   Patient presents with    Back Pain     Low back pain starting about 18 years ago notes he was told he had degenerative disc disease patient has been with physical therapy a few time  Patient notes mid back pain is off and a few years later        HPI: This 49 year old male presents today with complaints of low back pain.  He reports its aggravated when he plays with his kids.  He reports has been ongoing for at least 18 years and acknowledges that his body weight likely plays some role in his pain.  He reports over the last couple of years that the pain is started to migrate up his back.  He denies any radiation of pain down the legs.  Denies any numbness, tingling, weakness of either leg.    Patient also describes anterior left knee pain that worsens with deep squats, leg presses.  He runs on the treadmill but denies any significant pain in the knee when running.  Denies any swelling of the knee.    Past Medical History:    Arthritis    Back problem    DJD    DDD (degenerative disc disease), lumbar    GERD (gastroesophageal reflux disease)    RESOLVED WITH GLUTEN-FREE DIET    Hx of motion sickness    Hypercholesteremia    LPRD (laryngopharyngeal reflux disease)    Obesity, unspecified    CICI (obstructive sleep apnea)    Sleep apnea    CPAP    Spondylosis    Varicose veins of leg with pain    Visual impairment    GLASSES     Past Surgical History:   Procedure Laterality Date    Cholecystectomy      Colonoscopy  2022    Endovenous laser, 1st vein Right 11/13/2015    Procedure: ENDO LUMINAL LASER ABLATION;  Surgeon: LIONEL Lorenzana MD;  [facility]    Tonsillectomy       Current Outpatient Medications   Medication Sig Dispense Refill    Tirzepatide-Weight Management (ZEPBOUND) 10 MG/0.5ML Subcutaneous Solution Auto-injector Inject into the skin.      Cholecalciferol (VITAMIN D-3 OR) Take by mouth.      anastrozole 1 MG Oral Tab tab TAKE 1  Have You Had Botox Before?: has had botox TABLET BY MOUTH 48 HOURS AFTER EACH TESTOSTERONE INJECTION      TESTOSTERONE TD 1 x  WEEK      Multiple Vitamins-Minerals (MULTIVITAL-M) Oral Tab Take 1 tablet by mouth daily.      ZEPBOUND 7.5 MG/0.5ML Subcutaneous Solution Auto-injector Inject 7.5 mg into the skin once a week. (Patient not taking: Reported on 1/2/2025)      Multiple Vitamins-Minerals (AIRBORNE OR) Take by mouth. (Patient not taking: Reported on 1/2/2025)       Allergies[1]  Family History   Problem Relation Age of Onset    Cancer Mother         Pancreatic     Diabetes Father     Heart Disorder Father     Heart Surgery Father         CABG    Cancer Father      Social History     Occupational History    Not on file   Tobacco Use    Smoking status: Former     Types: Cigarettes    Smokeless tobacco: Never    Tobacco comments:     Smoked briefly at 20     Updated 1/2/25   Vaping Use    Vaping status: Never Used   Substance and Sexual Activity    Alcohol use: Not Currently    Drug use: No    Sexual activity: Not on file        ROS:  Comprehensive system review obtained and negative except as mentioned above      Physical Exam:    There were no vitals taken for this visit.  Constitutional: Awake, alert, no distress.  Psychological: Appropriate affect.  Respiratory: Unlabored breathing.  Patient can ambulate with a smooth, nonantalgic gait.  Alignment is well-maintained in the coronal and sagittal planes. Patient can fully flex, extend, and rotate the lumbar spine.  No significant tenderness to palpation along the low back.. Peripheral pulses intact. Limbs are warm and well-perfused.    Imaging: Imaging personally viewed, independently interpreted and radiology report read.  Radiographs of the lumbar spine obtained today demonstrate radiographs demonstrate moderate degree of intervertebral disc space narrowing at L3-4, L4-5, and L5-S1.  No evidence of spondylolisthesis.      Assessment/Plan:  Diagnoses and all orders for this visit:    Degeneration of  intervertebral disc of lumbar region, unspecified whether pain present  -     XR LUMBAR SPINE (MIN 4 VIEWS) (CPT=72110); Future      Assessment: 49-year-old male with low back pain in the setting of degenerative disc disease as well as left knee pain consistent with patellofemoral syndrome.    Plan: We regards to the low back, we discussed treatment options for low back pain including physical therapy to strengthen the postural and core muscles.  He has attended physical therapy in the past and is interested in repeating this.  Physical therapy referral was placed.  He will continue to use ibuprofen as needed.    With regards to the left knee, I discussed the etiology and pathophysiology of patellofemoral dysfunction with the patient in detail. We discussed the biomechanical nature of patellofemoral dysfunction causing inflammation behind the kneecap as a result of maltracking. We discussed principles of treatment including pain control with as-needed use of anti-inflammatory medications and possible intra-articular injections if oral anti-inflammatories fail to improve pain substantially. We also discussed improving biomechanics and patellar tracking through strengthening the quadriceps musculature with physical therapy or physician-directed exercises. We discussed that improving the maltracking may take up to 1 year of diligent exercises.  I also included instructions for physical therapy to work on his left knee biomechanics.  All questions were sought and answered satisfactorily.  He is happy with the plan will follow as advised.      Conor Bell PA-C  [facility]    This note was dictated using Dragon software.  While it was briefly proofread prior to completion, some grammatical, spelling, and word choice errors due to dictation may still occur.         [1] No Known Allergies

## 2025-01-13 ENCOUNTER — TELEPHONE (OUTPATIENT)
Facility: CLINIC | Age: 50
End: 2025-01-13

## 2025-01-13 DIAGNOSIS — M25.512 LEFT SHOULDER PAIN, UNSPECIFIED CHRONICITY: Primary | ICD-10-CM

## 2025-01-13 NOTE — TELEPHONE ENCOUNTER
New pt appt for left Shoulder pain no imaging avail please adviase  Future Appointments  1/20/2025  9:20 AM    Ari Murdock DO        [facility]        [facility] [address]  1/20/2025  4:15 PM    Alessia Vicente PA-C  [id number]           [facility] [address]  2/11/2025  1:20 PM    Laura James PA-C  [id number]           [facility] [address]

## 2025-01-15 NOTE — TELEPHONE ENCOUNTER
Spoke to patient and scheduled.   Future Appointments   Date Time Provider Department Center   1/20/2025  9:00 AM [facility]   1/20/2025  9:20 AM Ari Murdock DO [facility] [address]   1/20/2025  4:15 PM Alessia Vicente PA-C [id number]   2/11/2025  1:20 PM Laura James PA-C [facility] [address]

## 2025-01-16 ENCOUNTER — LAB ENCOUNTER (OUTPATIENT)
Dept: LAB | Age: 50
End: 2025-01-16
Attending: STUDENT IN AN ORGANIZED HEALTH CARE EDUCATION/TRAINING PROGRAM
Payer: COMMERCIAL

## 2025-01-16 DIAGNOSIS — Z00.00 WELL ADULT EXAM: ICD-10-CM

## 2025-01-16 DIAGNOSIS — E78.5 DYSLIPIDEMIA: ICD-10-CM

## 2025-01-16 LAB
ALBUMIN SERPL-MCNC: 4.5 G/DL (ref 3.2–4.8)
ALBUMIN/GLOB SERPL: 1.7 {RATIO} (ref 1–2)
ALP LIVER SERPL-CCNC: 39 U/L
ALT SERPL-CCNC: 25 U/L
ANION GAP SERPL CALC-SCNC: 2 MMOL/L (ref 0–18)
AST SERPL-CCNC: 26 U/L (ref ?–34)
BASOPHILS # BLD AUTO: 0.04 X10(3) UL (ref 0–0.2)
BASOPHILS NFR BLD AUTO: 0.6 %
BILIRUB SERPL-MCNC: 0.7 MG/DL (ref 0.3–1.2)
BUN BLD-MCNC: 16 MG/DL (ref 9–23)
CALCIUM BLD-MCNC: 10 MG/DL (ref 8.7–10.6)
CHLORIDE SERPL-SCNC: 104 MMOL/L (ref 98–112)
CHOLEST SERPL-MCNC: 145 MG/DL (ref ?–200)
CO2 SERPL-SCNC: 30 MMOL/L (ref 21–32)
CREAT BLD-MCNC: 1.12 MG/DL
EGFRCR SERPLBLD CKD-EPI 2021: 82 ML/MIN/1.73M2 (ref 60–?)
EOSINOPHIL # BLD AUTO: 0.12 X10(3) UL (ref 0–0.7)
EOSINOPHIL NFR BLD AUTO: 1.8 %
ERYTHROCYTE [DISTWIDTH] IN BLOOD BY AUTOMATED COUNT: 13.2 %
FASTING PATIENT LIPID ANSWER: NO
FASTING STATUS PATIENT QL REPORTED: NO
GLOBULIN PLAS-MCNC: 2.7 G/DL (ref 2–3.5)
GLUCOSE BLD-MCNC: 82 MG/DL (ref 70–99)
HCT VFR BLD AUTO: 49 %
HDLC SERPL-MCNC: 29 MG/DL (ref 40–59)
HGB BLD-MCNC: 16.2 G/DL
IMM GRANULOCYTES # BLD AUTO: 0.01 X10(3) UL (ref 0–1)
IMM GRANULOCYTES NFR BLD: 0.2 %
LDLC SERPL CALC-MCNC: 95 MG/DL (ref ?–100)
LYMPHOCYTES # BLD AUTO: 1.7 X10(3) UL (ref 1–4)
LYMPHOCYTES NFR BLD AUTO: 26.2 %
MCH RBC QN AUTO: 27.9 PG (ref 26–34)
MCHC RBC AUTO-ENTMCNC: 33.1 G/DL (ref 31–37)
MCV RBC AUTO: 84.5 FL
MONOCYTES # BLD AUTO: 0.55 X10(3) UL (ref 0.1–1)
MONOCYTES NFR BLD AUTO: 8.5 %
NEUTROPHILS # BLD AUTO: 4.07 X10 (3) UL (ref 1.5–7.7)
NEUTROPHILS # BLD AUTO: 4.07 X10(3) UL (ref 1.5–7.7)
NEUTROPHILS NFR BLD AUTO: 62.7 %
NONHDLC SERPL-MCNC: 116 MG/DL (ref ?–130)
OSMOLALITY SERPL CALC.SUM OF ELEC: 282 MOSM/KG (ref 275–295)
PLATELET # BLD AUTO: 316 10(3)UL (ref 150–450)
POTASSIUM SERPL-SCNC: 4.4 MMOL/L (ref 3.5–5.1)
PROT SERPL-MCNC: 7.2 G/DL (ref 5.7–8.2)
RBC # BLD AUTO: 5.8 X10(6)UL
SODIUM SERPL-SCNC: 136 MMOL/L (ref 136–145)
TRIGL SERPL-MCNC: 116 MG/DL (ref 30–149)
VLDLC SERPL CALC-MCNC: 19 MG/DL (ref 0–30)
WBC # BLD AUTO: 6.5 X10(3) UL (ref 4–11)

## 2025-01-16 PROCEDURE — 80053 COMPREHEN METABOLIC PANEL: CPT

## 2025-01-16 PROCEDURE — 85025 COMPLETE CBC W/AUTO DIFF WBC: CPT

## 2025-01-16 PROCEDURE — 36415 COLL VENOUS BLD VENIPUNCTURE: CPT

## 2025-01-16 PROCEDURE — 80061 LIPID PANEL: CPT

## 2025-01-20 ENCOUNTER — HOSPITAL ENCOUNTER (OUTPATIENT)
Dept: GENERAL RADIOLOGY | Age: 50
Discharge: HOME | End: 2025-01-20
Attending: FAMILY MEDICINE
Payer: COMMERCIAL

## 2025-01-20 ENCOUNTER — OFFICE VISIT (OUTPATIENT)
Dept: SURGERY | Facility: CLINIC | Age: 50
End: 2025-01-20
Payer: COMMERCIAL

## 2025-01-20 ENCOUNTER — OFFICE VISIT (OUTPATIENT)
Facility: CLINIC | Age: 50
End: 2025-01-20
Payer: COMMERCIAL

## 2025-01-20 VITALS — WEIGHT: 273 LBS | HEIGHT: 68 IN | BODY MASS INDEX: 41.37 KG/M2

## 2025-01-20 DIAGNOSIS — R82.90 URINE FINDING: ICD-10-CM

## 2025-01-20 DIAGNOSIS — M19.012 ARTHROSIS OF LEFT ACROMIOCLAVICULAR JOINT: ICD-10-CM

## 2025-01-20 DIAGNOSIS — M25.512 LEFT SHOULDER PAIN, UNSPECIFIED CHRONICITY: ICD-10-CM

## 2025-01-20 DIAGNOSIS — S46.112A STRAIN OF LONG HEAD OF BICEPS, LEFT, INITIAL ENCOUNTER: ICD-10-CM

## 2025-01-20 DIAGNOSIS — N50.89 TESTICULAR MASS: Primary | ICD-10-CM

## 2025-01-20 DIAGNOSIS — M75.102 ROTATOR CUFF SYNDROME OF LEFT SHOULDER: Primary | ICD-10-CM

## 2025-01-20 LAB
APPEARANCE: CLEAR
BILIRUBIN: NEGATIVE
GLUCOSE (URINE DIPSTICK): NEGATIVE MG/DL
KETONES (URINE DIPSTICK): NEGATIVE MG/DL
LEUKOCYTES: NEGATIVE
MULTISTIX LOT#: NORMAL NUMERIC
NITRITE, URINE: NEGATIVE
OCCULT BLOOD: NEGATIVE
PH, URINE: 6.5 (ref 4.5–8)
PROTEIN (URINE DIPSTICK): NEGATIVE MG/DL
SPECIFIC GRAVITY: 1.01 (ref 1–1.03)
URINE-COLOR: YELLOW
UROBILINOGEN,SEMI-QN: 0.2 MG/DL (ref 0–1.9)

## 2025-01-20 PROCEDURE — 99204 OFFICE O/P NEW MOD 45 MIN: CPT | Performed by: FAMILY MEDICINE

## 2025-01-20 PROCEDURE — 73030 X-RAY EXAM OF SHOULDER: CPT | Performed by: FAMILY MEDICINE

## 2025-01-20 PROCEDURE — 99203 OFFICE O/P NEW LOW 30 MIN: CPT | Performed by: PHYSICIAN ASSISTANT

## 2025-01-20 PROCEDURE — 81003 URINALYSIS AUTO W/O SCOPE: CPT | Performed by: PHYSICIAN ASSISTANT

## 2025-01-20 NOTE — PROGRESS NOTES
[facility], [facility]    Urology Consult Note    History of Present Illness:   Patient is a 49 year old male with obesity, DDD, GERD, CICI, HL, who presents today for evaluation of left testicular lump.    Seen by urology in 2022 for testicular pain.  Had trauma with roller hockey ball to the testicle in 2021, ultrasound at that time negative.  Had intermittent pain for years prior.    More recently has noticed left firm lump at bottom of testicle. No pain associated unless manipulated.     No voiding complaints.     HISTORY:  Past Medical History:    Arthritis    Back problem    DJD    DDD (degenerative disc disease), lumbar    GERD (gastroesophageal reflux disease)    RESOLVED WITH GLUTEN-FREE DIET    Hx of motion sickness    Hypercholesteremia    LPRD (laryngopharyngeal reflux disease)    Obesity, unspecified    CICI (obstructive sleep apnea)    Sleep apnea    CPAP    Spondylosis    Varicose veins of leg with pain    Visual impairment    GLASSES      Past Surgical History:   Procedure Laterality Date    Cholecystectomy      Colonoscopy  2022    Endovenous laser, 1st vein Right 11/13/2015    Procedure: ENDO LUMINAL LASER ABLATION;  Surgeon: LIONEL Lorenzana MD;  Location: [facility]    Tonsillectomy        Family History   Problem Relation Age of Onset    Cancer Mother         Pancreatic     Diabetes Father     Heart Disorder Father     Heart Surgery Father         CABG    Cancer Father       Social History:   Social History     Socioeconomic History    Marital status:    Tobacco Use    Smoking status: Former     Types: Cigarettes    Smokeless tobacco: Never    Tobacco comments:     Smoked briefly at 20     Updated 1/2/25   Vaping Use    Vaping status: Never Used   Substance and Sexual Activity    Alcohol use: Not Currently    Drug use: No   Other Topics Concern    Caffeine Concern No    Exercise Yes    Seat Belt No    Special Diet No    Stress Concern No    Weight  Concern Yes        Allergies  Allergies[1]    Review of Systems:   A 10-point review of systems was completed and is negative other than as noted above.    Physical Exam:   There were no vitals taken for this visit.    GENERAL APPEARANCE: well developed, well nourished, in no acute distress  NEUROLOGIC: no localizing neurologic signs, alert and oriented x 3, converses appropriately  HEAD: atraumatic, normocephalic  EYES: sclera non-icteric  ORAL CAVITY: mucosa moist  NECK/THYROID: no obvious masses or goiter  LUNGS: non-labored breathing  INGUINAL CANALS: no hernias  PENILE MEATUS: open and in normal location  PENIS normal  SCROTUM: normal  no varicocele  TESTES: normal anatomy  EPIDIDYMIS: normal anatomy suspected left epididymal tail cyst, although difficult to feel distinct from left testicle  EXTREMITIES: warm, well-perfused. No clubbing, cyanosis or edema.  SKIN: no obvious rashes    Results:     Laboratory Data:  Lab Results   Component Value Date    WBC 6.5 01/16/2025    HGB 16.2 01/16/2025    .0 01/16/2025     Lab Results   Component Value Date     01/16/2025    K 4.4 01/16/2025     01/16/2025    CO2 30.0 01/16/2025    BUN 16 01/16/2025    GLU 82 01/16/2025    GFRAA 90 04/24/2022    AST 26 01/16/2025    ALT 25 01/16/2025    TP 7.2 01/16/2025    ALB 4.5 01/16/2025    CA 10.0 01/16/2025       Urinalysis Results (last three years):  Recent Labs     04/19/22  0910 04/24/22  0015 02/14/23  0843 01/20/25  1616   COLORUR  --  Colorless* Yellow  --    CLARITY  --  Clear Clear  --    SPECGRAVITY 1.015 1.004 1.015 1.015   PHURINE  --  7.0 7.0 6.5   PROUR  --  Negative Negative  --    GLUUR Negative Negative Negative  --    KETUR  --  Negative Negative  --    BILUR  --  Negative Negative  --    BLOODURINE  --  Negative Negative  --    NITRITE  --  Negative Negative Negative   UROBILINOGEN  --  <2.0 <2.0  --    LEUUR  --  Negative Negative  --    UASA  --   --  20*  --    WBCUR  --   --  1-5  --     RBCUR  --   --  None Seen  --    BACUR  --   --  None Seen  --        Urine Culture Results (last three years):  No results found for: \"URINECUL\"    Imaging  XR SHOULDER, COMPLETE (MIN 2 VIEWS), LEFT (CPT=73030)    Result Date: 1/20/2025  PROCEDURE:  XR SHOULDER, COMPLETE (MIN 2 VIEWS), LEFT (CPT=73030)  TECHNIQUE:  Multiple views were obtained.  COMPARISON:  None.  INDICATIONS:  M25.512 Left shoulder pain, unspecified chronicity  PATIENT STATED HISTORY: (As transcribed by Technologist)  Ortho evaluation. Patient states he has chronic left shoulder pain and intermittent left shoulder numbness radiating down to his fingertips that worsens with certain movements. Denies any injury.    FINDINGS:  No acute fracture or dislocation is seen.  There is normal bone mineral density.  Normal alignment.  If clinical symptoms persist then recommend follow-up imaging.            CONCLUSION:  See above.   LOCATION:  [facility]   Dictated by (CST): Balbir Sun MD on 1/20/2025 at 9:24 AM     Finalized by (CST): Balbir Sun MD on 1/20/2025 at 9:25 AM       XR LUMBAR SPINE (MIN 4 VIEWS) (CPT=72110)    Result Date: 1/2/2025  PROCEDURE:  XR LUMBAR SPINE (MIN 4 VIEWS) (CPT=72110)  TECHNIQUE:  AP, lateral, oblique, and coned down L5-S1 views were obtained.  COMPARISON:  ENZO SARMIENTO, SPINE LUMBAR TRAUMA (2 VIEW), 6/07/2012, 8:42 AM.  INDICATIONS:  M51.369 Degeneration of intervertebral disc of lumbar region, unspecified whether pain present  PATIENT STATED HISTORY: (As transcribed by Technologist)  Ortho evaluation. Patient states he has chronic lower back pain. Adds that his right side is worse. Denies any injury.              CONCLUSION:    Advanced degenerative disc disease L5-S1, L4-L5 and moderately advanced L3-L4 disc degenerative disease.  These levels show disc space narrowing, endplate sclerosis, osteophyte and at the L3-L4 level there is mild grade 1 degenerative retrolisthesis of L3.  Facet arthropathy also present at these  levels.  No sign of vertebral compression or other fracture.  No oblique views obtained.  LOCATION:  [place]   Dictated by (CST): Heladio Schneider MD on 1/02/2025 at 11:33 AM     Finalized by (CST): Heladio Schneider MD on 1/02/2025 at 11:34 AM           Impression:     Patient is a 49 year old male with obesity, DDD, GERD, CICI, HL, who presents today for evaluation of left testicular lump.    Recommendations:  US scrotum.    Thank you very much for this consult. Please call if there are any questions or concerns.     Alessia Gaitan PA-C  Urology  [facility]    Date: 1/20/2025           [1] No Known Allergies

## 2025-01-20 NOTE — H&P
Sports Medicine Clinic Note     Subjective:     Current Visit Date: 1/20/2025     [facility] Complaint: Left shoulder pain    History: This is a very pleasant 49-year-old RHD male presents with chronic left shoulder pain, worsening recently, particularly during activities such as bench pressing and biking. Pain is described as sharp, intermittent, and rated 5/10 at its worst. Pain has been present on and off since 2015 without injury. Pain is alleviated by rest and worsened by certain movements or lifting. No history of acute injury. The patient denies pain at night or difficulty falling asleep due to pain. Reports prior acupuncture/chiropractic treatments provided partial relief. Denies prior physical therapy success and has not tried injections, anti-inflammatory medications, or steroids.    Objective:    Left Shoulder Examination:    Inspection: No visible swelling, erythema, or deformity noted.  Palpation: Tenderness over the anterior and lateral shoulder, particularly in the region of the rotator cuff.  Range of Motion: Pain with active forward flexion and abduction above 90 degrees; full passive range of motion with mild discomfort.  Neurovascular: Sensation intact in the axillary, radial, ulnar, and median nerve distributions. No motor deficits. Radial pulse palpable with brisk capillary refill.  Special Tests: Positive Ott-Darrick and Neer impingement tests. Negative drop arm, empty can, and internal/external rotation lag signs.    Diagnostic Tests:     X-rays of the left shoulder (1/20/2025) reviewed:    No acute fracture or dislocation.  Mild acromioclavicular joint degenerative changes.  Glenohumeral joint intact without significant arthropathy.  Normal alignment and bone mineral density.    Assessment:    Suspected left rotator cuff syndrome.  Mild acromioclavicular joint osteoarthritis.     Plan:    Therapy: Initiate physical therapy focusing on rotator cuff strengthening, scapular stabilization, and  flexibility exercises.  Medications: Patient defers oral analgesics or anti-inflammatory medications at this time.  Injections: Patient declines corticosteroid injection.  Activity Recommendations: Avoid aggravating activities such as heavy lifting, overhead motions, or bench pressing. Encourage low-impact activities as tolerated.    Follow-Up: Tentatively scheduled in 6-8 weeks to assess response to conservative management.       Ari Murdock DO, CAQSM   Primary Care Sports Medicine

## 2025-01-20 NOTE — PROGRESS NOTES
Sports Medicine Clinic Note    Subjective:    Current Visit Date: 1/20/2025    [facility] Complaint: Left shoulder pain    History: This is a very pleasant 49-year-old RHD male presents with chronic left shoulder pain, worsening recently, particularly during activities such as bench pressing and biking. Pain is described as sharp, intermittent, and rated 5/10 at its worst. Pain has been present on and off since 2015 without injury. Pain is alleviated by rest and worsened by certain movements or lifting. No history of acute injury. The patient denies pain at night or difficulty falling asleep due to pain. Reports prior acupuncture/chiropractic treatments provided partial relief. Denies prior physical therapy success and has not tried injections, anti-inflammatory medications, or steroids.    Objective:    Left Shoulder Examination:    Inspection: No visible swelling, erythema, or deformity noted.  Palpation: Tenderness over the anterior and lateral shoulder, particularly in the region of the rotator cuff.  Range of Motion: Pain with active forward flexion and abduction above 90 degrees; full passive range of motion with mild discomfort.  Neurovascular: Sensation intact in the axillary, radial, ulnar, and median nerve distributions. No motor deficits. Radial pulse palpable with brisk capillary refill.  Special Tests: Positive Ott-Darrick and Neer impingement tests. Negative drop arm, empty can, and internal/external rotation lag signs.    Diagnostic Tests:    X-rays of the left shoulder (1/20/2025) reviewed:    No acute fracture or dislocation.  Mild acromioclavicular joint degenerative changes.  Glenohumeral joint intact without significant arthropathy.  Normal alignment and bone mineral density.    Assessment:    Suspected left rotator cuff syndrome.  Mild acromioclavicular joint osteoarthritis.    Plan:    Therapy: Initiate physical therapy focusing on rotator cuff strengthening, scapular stabilization, and  flexibility exercises.  Medications: Patient defers oral analgesics or anti-inflammatory medications at this time.  Injections: Patient declines corticosteroid injection.  Activity Recommendations: Avoid aggravating activities such as heavy lifting, overhead motions, or bench pressing. Encourage low-impact activities as tolerated.    Follow-Up: Tentatively scheduled in 6-8 weeks to assess response to conservative management.      Ari Murdock DO, CAQSM   Primary Care Sports Medicine

## 2025-01-22 ENCOUNTER — MOBILE ENCOUNTER (OUTPATIENT)
Dept: SURGERY | Facility: CLINIC | Age: 50
End: 2025-01-22

## 2025-01-22 ENCOUNTER — HOSPITAL ENCOUNTER (OUTPATIENT)
Dept: ULTRASOUND IMAGING | Age: 50
Discharge: HOME | End: 2025-01-22
Attending: PHYSICIAN ASSISTANT
Payer: COMMERCIAL

## 2025-01-22 DIAGNOSIS — N43.40 SPERMATOCELE OF EPIDIDYMIS: Primary | ICD-10-CM

## 2025-01-22 DIAGNOSIS — N50.89 TESTICULAR MASS: ICD-10-CM

## 2025-01-22 PROCEDURE — 93975 VASCULAR STUDY: CPT | Performed by: PHYSICIAN ASSISTANT

## 2025-01-22 PROCEDURE — 76870 US EXAM SCROTUM: CPT | Performed by: PHYSICIAN ASSISTANT

## 2025-01-23 RX ORDER — TIRZEPATIDE 10 MG/.5ML
INJECTION, SOLUTION SUBCUTANEOUS
Refills: 0 | OUTPATIENT
Start: 2025-01-23

## 2025-01-23 NOTE — TELEPHONE ENCOUNTER
Requesting Zepbound 10mg  Last OV: 10/29/24 Physical  RTC: 1 year  Last Rx'd 12/3/24 #2mL with 0 refills    Future Appointments   Date Time Provider Department Center   1/31/2025  8:00 AM Preston Mullins, PT [facility]   2/7/2025  8:00 AM Preston Mullins, PT [facility]   2/11/2025  1:20 PM Laura James, [facility] [address]   2/14/2025  8:00 AM Preston Mullins, PT [facility]   2/21/2025  8:00 AM Preston Mlulins, PT [facility]   2/28/2025  8:00 AM Preston Mullins, PT [facility]       Non-protocol med:  Rx pended and routed for approval/denial

## 2025-01-27 ENCOUNTER — PATIENT MESSAGE (OUTPATIENT)
Facility: CLINIC | Age: 50
End: 2025-01-27

## 2025-01-27 DIAGNOSIS — E66.01 OBESITY, MORBID, BMI 40.0-49.9 (HCC): Primary | ICD-10-CM

## 2025-01-28 NOTE — TELEPHONE ENCOUNTER
Requesting zepbound 10 mg  LOV: 7/25/24  RTC: 3 months  Last Relevant Labs: na  Filled: 12/3/24 #2ml with 1 refills 10 mg dose    2/11/2025  1:20 PM Laura James, [facility] [address]     Patients appt for 1/6/25 was cancelled by our office.

## 2025-01-29 RX ORDER — TIRZEPATIDE 10 MG/.5ML
10 INJECTION, SOLUTION SUBCUTANEOUS WEEKLY
Qty: 2 ML | Refills: 1 | Status: SHIPPED | OUTPATIENT
Start: 2025-01-29

## 2025-01-30 ENCOUNTER — TELEPHONE (OUTPATIENT)
Dept: PHYSICAL THERAPY | Facility: HOSPITAL | Age: 50
End: 2025-01-30

## 2025-01-31 ENCOUNTER — OFFICE VISIT (OUTPATIENT)
Facility: LOCATION | Age: 50
End: 2025-01-31
Attending: PHYSICIAN ASSISTANT
Payer: COMMERCIAL

## 2025-01-31 DIAGNOSIS — M51.369 DEGENERATION OF INTERVERTEBRAL DISC OF LUMBAR REGION, UNSPECIFIED WHETHER PAIN PRESENT: Primary | ICD-10-CM

## 2025-01-31 DIAGNOSIS — M22.2X2 PATELLOFEMORAL PAIN SYNDROME OF LEFT KNEE: ICD-10-CM

## 2025-01-31 PROCEDURE — 97140 MANUAL THERAPY 1/> REGIONS: CPT

## 2025-01-31 PROCEDURE — 97161 PT EVAL LOW COMPLEX 20 MIN: CPT

## 2025-01-31 PROCEDURE — 97110 THERAPEUTIC EXERCISES: CPT

## 2025-01-31 NOTE — PROGRESS NOTES
UPPER EXTREMITY EVALUATION:     Diagnosis:   Rotator cuff syndrome of left shoulder (M75.102) Patient:  Emeka Turpin (49 year old, male)        Referring Provider:   Ari Murdock  Today's Date   1/31/2025    Precautions:  None   Date of Evaluation: 01/31/25  Next MD visit: No data recorded  Date of Surgery: N/A     PATIENT SUMMARY   Summary of chief complaints: (L) shoulder pain  History of current condition: Presents with (L) shoulder pain for past year or so. Had some chiropractic treatment previously with partial relief. Typically has pain with workouts including bench pressing, overhead weight lifting, reaching to higher surfaces at times, and while riding his bike with arms held straight out. Denies any significant neck pain. He does endorse some numbness in the forearm and hand while his arms are outstretched riding the bike (only activity that causes this). Relieving factors include rest. He is (R) handed. Pain in the shoulder can be diffuse at times. Dull and achy but can be sharp depending on movement. Only imaging that was taken was x-rays with mild arthritis.   Pain level: current 1 /10, at best 1 /10, at worst 5 /10  Description of symptoms: dull & achy sensations at rest, can be sharp with a variety of reaching movements   Occupation: IT (desk work)   Leisure activities/Hobbies: working out, riding bike   Prior level of function: no pain or restriction prior to initial symptoms  Current limitations: dressing/bathing, reaching to higher surfaces, lifting heavier things, workouts, riding bike  Pt goals: decrease pain, improve ability to perform everyday activity, get back to regular workout regimen  Hand Dominance: right   Past medical history was reviewed with Emeka.   Imaging/Tests: see chart   Emeka  has a past medical history of Arthritis, Back problem, DDD (degenerative disc disease), lumbar (06/18/2012), GERD (gastroesophageal reflux disease), motion sickness, Hypercholesteremia, LPRD  (laryngopharyngeal reflux disease) (02/09/2017), Obesity, unspecified, CICI (obstructive sleep apnea), Sleep apnea, Spondylosis (06/18/2012), Varicose veins of leg with pain (03/30/2015), and Visual impairment.  He  has a past surgical history that includes tonsillectomy; cholecystectomy; endovenous laser, 1st vein (Right, 11/13/2015); and colonoscopy (2022).    ASSESSMENT  Emeka presents to physical therapy evaluation with primary c/o (L) shoulder pain. The results of the objective tests and measures show mild decrease in (L) shoulder ROM, upper quarter flexibility deficits, postural dysfunction, scapular strength deficits, mild RC weakness, shoulder mobility restrictions, (+) spurling. Functional deficits include but are not limited to dressing/bathing, reaching to higher surfaces, lifting heavier things, workouts, riding bike. Signs and symptoms are consistent with diagnosis of rotator cuff syndrome of (L) shoulder. Pt and PT discussed evaluation findings, pathology, POC and HEP.  Pt voiced understanding and performs HEP correctly without reported pain. Skilled Physical Therapy is medically necessary to address the above impairments and reach functional goals.  OBJECTIVE:   Musculoskeletal  Observation/Posture: forward head posture; increased thoracic kyphosis; scapula abduction; scapula anterior tilt; shoulder internal rotation    Palpation: Mild tightness & noted hypertonicity noted to (L) infraspinatus, subscapularis, teres major, rhomboids     Accessory motion: (L) scapulohumeral rhythm dysfunction; GH joint glides WNL    Special Tests: (+) Ott-Darrick & Neer impingement tests, (+) spurling     DIO ROM and Strength:     Cervical ROM MMT (-/5)     Flex WFL       Ext WFL      R L R L     Side bend Mild to moderate restriction Mild restriction         Rotation Mild to moderate restriction Mild restriction       ,   Shoulder   AROM MMT (-/5)    R L R L     Flex 155 degrees 150 degrees 5/5 5/5     Ext 40  degrees 40 degrees 5/5 5/5     Abd (C5) 155 degrees 150 degrees (mild pain & tightness) 5/5 4/5     IR T9 (functional IR/HBB) T9 (functional IR/HBB) 5/5 5/5     ER T3 T3 5/5 4/5     Low Trap n/a 4-/5 4-/5     Mid Trap n/a 4-/5 4-/5     SA n/a           Flexibility:  UE Flexibility R L     Upper Trap Moderate restriction Moderate restriction     Levator Scap Moderate restriction Moderate restriction     Pec Major Moderate restriction Moderate restriction     Scalenes         Latissimus         Bicep           Neurological:  Sensation: Intact     Today's Treatment and Response:   Pt education was provided on exam findings, treatment diagnosis, treatment plan, expectations, and prognosis.  Today's Treatment       1/31/2025   Treatment   Therapeutic Exercise Seated shoulder ER GTB 2 x 10   Standing scapular retraction w/GTB shoulder extension (palms out) 2 x 10 (5 second hold)  Standing shoulder IR blue theratube 2 x 10   Seated UT stretch 30 sec x 3  Seated scapular retraction 2 x 10 (5 second hold)   Manual Therapy STM/MFR (L) infraspinatus, teres major    Therapeutic Exercise Min 10   Manual Therapy Min 10   Evaluation Min 25   Total of Timed Procedures 20   Total of Service Based 25   Total Treatment Time 45         Patient was instructed in and issued a HEP for: Access Code: [email or web address]  URL: [email or web address]  Date: 01/31/2025  Prepared by: Preston Mullins    Exercises  - Shoulder External Rotation and Scapular Retraction with Resistance  - 1-2 x daily - 7 x weekly - 2 sets - 10 reps  - Seated Scapular Retraction  - 1-2 x daily - 7 x weekly - 2 sets - 10 reps - 5 second hold  - Shoulder Extension with Resistance - Palms Forward  - 1-2 x daily - 7 x weekly - 2 sets - 10 reps - 5 seconds hold  - Shoulder Internal Rotation with Resistance  - 1-2 x daily - 7 x weekly - 2 sets - 10 reps    Charges:  PT EVAL: Low Complexity, TherEx 1, Manual 1  In agreement with evaluation findings and clinical  rationale, this evaluation involved LOW COMPLEXITY decision making due to no personal factors/comorbidities, 1-2 body structures involved/activity limitations, and stable symptoms as documented in the evaluation.                                                          PLAN OF CARE:    Goals: (to be met in 8 visits)   Goals       Therapy Goals     Pt will report 50% reduction in pain at worst from 5/10 to 2/10 or better  Pt will demonstrate 5-8 degree improvement in AROM (L) shoulder flexion & abduction in order to perform reaching activities with more ease  Pt will verbalize being able to perform light shoulder workouts at the gym with minimal to no discomfort during or after   Pt will perform all ADLs and household activities with minimal to no pain  Pt will demonstrate 5/5 (L) RC strength in order to maximize ability to carry heavier items as well as perform workout routines   Pt will demonstrate 4/5 scapular strength throughout in order to better assist proper biomechanics of the shoulder complex with shoulder elevation  Pt will demonstrate performance of HEP independently in order to sustain changes made with therapy sessions               Frequency / Duration: Patient will be seen 2x/week or a total of 8  visits over a 90 day period. Treatment will include: Manual Therapy; Neuromuscular Re-education; Self-Care Home Management; Therapeutic Activities; Therapeutic Exercise; Home Exercise Program instruction    Education or treatment limitation: None     Rehab Potential: good     Assigned Oswestry instead of quickdash for the outcome measure (needs to complete next visit)    Patient/Family/Caregiver was advised of these findings, precautions, and treatment options and has agreed to actively participate in planning and for this course of care.    Thank you for your referral. Please co-sign or sign and return this letter via fax as soon as possible to [phone]. If you have any questions, please contact me at  Dept: [phone]    Sincerely,  Electronically signed by therapist: Preston Mullins PT  Physician's certification required: Yes  I certify the need for these services furnished under this plan of treatment and while under my care.    X___________________________________________________ Date____________________    Certification From: 1/31/2025  To:5/1/2025

## 2025-02-07 ENCOUNTER — OFFICE VISIT (OUTPATIENT)
Facility: LOCATION | Age: 50
End: 2025-02-07
Attending: PHYSICIAN ASSISTANT
Payer: COMMERCIAL

## 2025-02-07 PROCEDURE — 97140 MANUAL THERAPY 1/> REGIONS: CPT

## 2025-02-07 PROCEDURE — 97110 THERAPEUTIC EXERCISES: CPT

## 2025-02-07 NOTE — PROGRESS NOTES
Patient: Emeka Turpin (49 year old, male) Referring Provider:  Insurance:   Diagnosis: Rotator cuff syndrome of left shoulder (M75.102) Conor Lee Ray  [employer]   Date of Surgery: N/A Next MD visit:  N/A   Precautions:  None No data recorded Referral Information:    Date of Evaluation: Req: 0, Auth: 0, Exp:     01/31/25 POC Auth Visits:  8       Today's Date   2/7/2025    Subjective  Did some bench pressing the other day and shoulder became achy. Occasional numbness in the arm       Pain:       Objective  Near full AROM (L) shoulder flexion & abduction (scapulohumeral rhythm deficit)      Assessment  Audible \"popping\" sensation with passive (L) shoulder ER. He does have tenderness noted to teres major, lat, subscapularis musculature. Rounded & forward shoulders. Overall good active shoulder flexion and abduction with some scapulhumeral rhythm dysfunction    Goals (to be met in 8 visits)   Goals       Therapy Goals     Pt will report 50% reduction in pain at worst from 5/10 to 2/10 or better  Pt will demonstrate 5-8 degree improvement in AROM (L) shoulder flexion & abduction in order to perform reaching activities with more ease  Pt will verbalize being able to perform light shoulder workouts at the gym with minimal to no discomfort during or after   Pt will perform all ADLs and household activities with minimal to no pain  Pt will demonstrate 5/5 (L) RC strength in order to maximize ability to carry heavier items as well as perform workout routines   Pt will demonstrate 4/5 scapular strength throughout in order to better assist proper biomechanics of the shoulder complex with shoulder elevation  Pt will demonstrate performance of HEP independently in order to sustain changes made with therapy sessions               Plan  Continue to progress scapular stabilization & RC strengthening exercises    Treatment Last 4 Visits       1/31/2025 2/7/2025   PT Treatment   Treatment Day  2   Therapeutic  Exercise Seated shoulder ER GTB 2 x 10   Standing scapular retraction w/GTB shoulder extension (palms out) 2 x 10 (5 second hold)  Standing shoulder IR blue theratube 2 x 10   Seated UT stretch 30 sec x 3  Seated scapular retraction 2 x 10 (5 second hold) OH pulley flexion x 15 (5 second hold)  H/L shoulder horizontal abduction RTB 2 x 15  S/L shoulder ER 2# 2 x 15  Seated (L) UT stretch 30 sec x 3  Seated scapular retraction x 15 (5 second hold)  Standing SA FR up wall x 15  Standing scapular retraction w/shoulder extension (palms out) 2 x 10 (5 second hold)  Standing shoulder circles against wall with YPB x 20 CW/CCW  Standing YTB D2 flexion x 10   Manual Therapy STM/MFR (L) infraspinatus, teres major  STM/MFR teres major, subscapularis, pec minor  S/L scapular posterior tilt, medial & inferior glide   Therapeutic Exercise Min 10 28   Manual Therapy Min 10 15   Evaluation Min 25    Total of Timed Procedures 20 43   Total of Service Based 25 0   Total Treatment Time 45 43         HEP  Access Code: [email or web address]  URL: [email or web address]  Date: 01/31/2025  Prepared by: Preston Mullins     Exercises  - Shoulder External Rotation and Scapular Retraction with Resistance  - 1-2 x daily - 7 x weekly - 2 sets - 10 reps  - Seated Scapular Retraction  - 1-2 x daily - 7 x weekly - 2 sets - 10 reps - 5 second hold  - Shoulder Extension with Resistance - Palms Forward  - 1-2 x daily - 7 x weekly - 2 sets - 10 reps - 5 seconds hold  - Shoulder Internal Rotation with Resistance  - 1-2 x daily - 7 x weekly - 2 sets - 10 reps      Charges     TherEx 2, Manual 1

## 2025-02-11 ENCOUNTER — TELEMEDICINE (OUTPATIENT)
Facility: CLINIC | Age: 50
End: 2025-02-11
Payer: COMMERCIAL

## 2025-02-11 DIAGNOSIS — E78.5 HYPERLIPIDEMIA, UNSPECIFIED HYPERLIPIDEMIA TYPE: ICD-10-CM

## 2025-02-11 DIAGNOSIS — G47.33 OSA (OBSTRUCTIVE SLEEP APNEA): ICD-10-CM

## 2025-02-11 DIAGNOSIS — Z51.81 THERAPEUTIC DRUG MONITORING: Primary | ICD-10-CM

## 2025-02-11 DIAGNOSIS — E66.01 OBESITY, MORBID, BMI 40.0-49.9 (HCC): ICD-10-CM

## 2025-02-11 RX ORDER — TIRZEPATIDE 10 MG/.5ML
10 INJECTION, SOLUTION SUBCUTANEOUS WEEKLY
Qty: 2 ML | Refills: 1 | Status: SHIPPED | OUTPATIENT
Start: 2025-02-11

## 2025-02-11 NOTE — PROGRESS NOTES
This visit is conducted using Telemedicine with live, interactive video and audio.    Patient has been referred to the [facility] website at [email or web address] to review the yearly Consent to Treat document.    Patient understands and accepts financial responsibility for any deductible, co-insurance and/or co-pays associated with this service.      HISTORY OF PRESENT ILLNESS  Chief Complaint   Patient presents with    Weight Check       Emeka Turpin is a 49 year old male here for follow up in medical weight loss program.   267lbs  Pt is compliant on zepbound  Denies chest pain, shortness of breath, dizziness, blurred vision, headache, paresthesia, nausea/vomiting.   Made it through the holidays and his birthday is coming up  Feeling full sooner, smaller portion size  Trying to get more protein  Exercise/Activity: 2x/ week strength and cardio, would like to get up to 4 days a week  Nutrition: 24 hour food log reviewed, eating regular meals, +protein  Stress: 6/10  Sleep: 5 hours/night     C Follow Up    General Information  Success Moment: Down yo 267  Challenging Moment: Work out more  Nutrition Recall  Breakfast: Usually protein bar or shake Lunch: Lunch meat or salads   Dinner: Various l, but meat and vegetables most nights Snacks: Protein   bars or meat sticks. Sometimes cheese   Fluids: Water, gatorade, diet soda Dining Out: 3   Exercise   Patient stated exercises # days/week: 2  Patient stated perceived level of   exertion: 4 Anaerobic Days: 2   Aerobic Days: 2   Patient stated average level of stress: 6  Sleep   Patient stated # hours uninterrupted sleep: 5   Patient stated feels   restful: No      Cause of disruption of sleep: Children   Goals: Its going well, just continuing my path with some extra worksouts              Wt Readings from Last 6 Encounters:   01/20/25 273 lb (123.8 kg)   10/29/24 273 lb 6 oz (124 kg)   07/25/24 286 lb (129.7 kg)   05/30/24 (!) 302 lb 2 oz (137 kg)   02/05/24 299 lb (135.6  kg)   01/15/24 294 lb 8 oz (133.6 kg)            Breakfast Lunch Dinner Snacks Fluids              REVIEW OF SYSTEMS  GENERAL HEALTH: feels well otherwise, denied any fevers chills or night sweats   RESPIRATORY: denies shortness of breath   CARDIOVASCULAR: denies chest pain  GI: denies abdominal pain    EXAM  There were no vitals taken for this visit.  GENERAL: well developed, well nourished,in no apparent distress, A/O x3  SKIN: no rashes,no suspicious lesions on visible skin  HEENT: atraumatic, normocephalic  LUNGS: no increased effort or work with breathing   NEURO: speaking fluently and in clear sentences    Lab Results   Component Value Date    WBC 6.5 01/16/2025    RBC 5.80 (H) 01/16/2025    HGB 16.2 01/16/2025    HCT 49.0 01/16/2025    MCV 84.5 01/16/2025    MCH 27.9 01/16/2025    MCHC 33.1 01/16/2025    RDW 13.2 01/16/2025    .0 01/16/2025     Lab Results   Component Value Date    GLU 82 01/16/2025    BUN 16 01/16/2025    BUNCREA 18.4 02/14/2023    CREATSERUM 1.12 01/16/2025    ANIONGAP 2 01/16/2025    GFR 97 10/09/2017    GFRNAA 78 04/24/2022    GFRAA 90 04/24/2022    CA 10.0 01/16/2025    OSMOCALC 282 01/16/2025    ALKPHO 39 (L) 01/16/2025    AST 26 01/16/2025    ALT 25 01/16/2025    BILT 0.7 01/16/2025    TP 7.2 01/16/2025    ALB 4.5 01/16/2025    GLOBULIN 2.7 01/16/2025     01/16/2025    K 4.4 01/16/2025     01/16/2025    CO2 30.0 01/16/2025     Lab Results   Component Value Date     01/17/2024    A1C 5.4 01/17/2024     Lab Results   Component Value Date    CHOLEST 145 01/16/2025    TRIG 116 01/16/2025    HDL 29 (L) 01/16/2025    LDL 95 01/16/2025    VLDL 19 01/16/2025    TCHDLRATIO 3.90 10/09/2017    NONHDLC 116 01/16/2025     Lab Results   Component Value Date    TSH 1.770 02/14/2023     No results found for: \"B12\", \"VITB12\"  No results found for: \"VITD\", \"QVITD\", \"AXLB60DI\"    Medications Ordered Prior to Encounter[1]    ASSESSMENT  Analyzed weight data:       Diagnoses and  all orders for this visit:    Therapeutic drug monitoring    Obesity, morbid, BMI 40.0-49.9 ([facility])  -     Tirzepatide-Weight Management (ZEPBOUND) 10 MG/0.5ML Subcutaneous Solution Auto-injector; Inject 10 mg into the skin once a week.    Hyperlipidemia, unspecified hyperlipidemia type    CICI (obstructive sleep apnea)        PLAN  Initial Weight: 286lbs  Initial Weight Date: 7/25/24  Today's Weight: 267lbs  5% Goal: 14.3lbs  10% Goal: 28.6lbs  Total Weight Loss: Net loss 19lbs    Will continue zepbound - advised side effects and adverse effects of medication   HLD - lifestyle changes  CICI - zepbound, cpap compliance  Consistency with logging foods - protein and produce  Continue to work on increasing strength/resistance training  Nutrition: low carb diet/ recommended to eat breakfast daily/ regular protein intake  Medication use and side effects reviewed with patient.  Medication contraindications: stimulant  Follow up with dietitian and psychologist as recommended.  Discussed the role of sleep and stress in weight management.  Counseled on comprehensive weight loss plan including attention to nutrition, exercise and behavior/stress management for success. See patient instruction below for more details.  Discussed strategies to overcome barriers to successful weight loss and weight maintenance  FITTE: ACSM recommendations (150-300 minutes/ week in active weight loss)   Weight Loss consent to treat reviewed and signed     There are no Patient Instructions on file for this visit.    No follow-ups on file.    Patient verbalizes understanding.    Laura James PA-C  2/11/2025    Please note that the following visit was completed using two-way, real-time interactive audio and video communication.  This has been done in good josy to provide continuity of care in the best interest of the provider-patient relationship, due to the ongoing public health crisis/national emergency and because of restrictions of visitation.   There are limitations of this visit as no physical exam could be performed.  Every conscious effort was taken to allow for sufficient and adequate time.  This billing was spent on reviewing labs, medications, radiology tests and decision making.  Appropriate medical decision-making and tests are ordered as detailed in the plan of care above    Laura James PA-C           [1]   Current Outpatient Medications on File Prior to Visit   Medication Sig Dispense Refill    ZEPBOUND 7.5 MG/0.5ML Subcutaneous Solution Auto-injector Inject 7.5 mg into the skin once a week. (Patient not taking: Reported on 1/20/2025)      Multiple Vitamins-Minerals (AIR[place]ORNE OR) Take by mouth.      Cholecalciferol (VITAMIN D-3 OR) Take by mouth.      anastrozole 1 MG Oral Tab tab TAKE 1 TABLET BY MOUTH 48 HOURS AFTER EACH TESTOSTERONE INJECTION (Patient not taking: Reported on 1/20/2025)      TESTOSTERONE TD 1 x  WEEK (Patient not taking: Reported on 1/20/2025)      Multiple Vitamins-Minerals (MULTIVITAL-M) Oral Tab Take 1 tablet by mouth daily.       No current facility-administered medications on file prior to visit.

## 2025-02-12 ENCOUNTER — TELEPHONE (OUTPATIENT)
Dept: INTERNAL MEDICINE CLINIC | Facility: CLINIC | Age: 50
End: 2025-02-12

## 2025-02-13 NOTE — TELEPHONE ENCOUNTER
Approved    Prior authorization approved  Payer: [employer] Commerical Case ID: [id number]    [phone]    [phone]  Note from payer: Request Reference Number: [id number].  ZEPBOUND     INJ 10/0.5ML is approved through 02/12/2026.  Your patient may now fill this prescription and it will be covered.  Approval Details    Authorization number: [id number]  Authorized from February 12, 2025 to February 12, 2026  Electronic appeal: Not supported

## 2025-02-14 ENCOUNTER — APPOINTMENT (OUTPATIENT)
Dept: PHYSICAL THERAPY | Age: 50
End: 2025-02-14
Attending: PHYSICIAN ASSISTANT
Payer: COMMERCIAL

## 2025-02-14 ENCOUNTER — OFFICE VISIT (OUTPATIENT)
Facility: LOCATION | Age: 50
End: 2025-02-14
Attending: PHYSICIAN ASSISTANT
Payer: COMMERCIAL

## 2025-02-14 PROCEDURE — 97140 MANUAL THERAPY 1/> REGIONS: CPT

## 2025-02-14 PROCEDURE — 97110 THERAPEUTIC EXERCISES: CPT

## 2025-02-14 NOTE — PROGRESS NOTES
Patient: Emeka Turpin (50 year old, male) Referring Provider:  Insurance:   Diagnosis: Rotator cuff syndrome of left shoulder (M75.102) Conor Lee Ray  [employer]   Date of Surgery: N/A Next MD visit:  N/A   Precautions:  None No data recorded Referral Information:    Date of Evaluation: Req: 0, Auth: 0, Exp:     01/31/25 POC Auth Visits:  8       Today's Date   2/14/2025    Subjective  Shoulder feels a little bit better. Not as achy during the day. Randomly moving it in different directions will cause some mild discomfort       Pain: 1/10     Objective  Moderate tenderness & tightness noted subscapularis & infraspinatus      Assessment  Intermittent audible popping with PROM. Has near full A/PROM in all planes. Moderate tenderness & tightness noted subscapularis, infraspinatus, periscapular musculature which improved with soft tissue treatment. Tolerating exercises without increased symptoms. No reported paresthesia into the arm    Goals (to be met in 8 visits)   Goals       Therapy Goals     Pt will report 50% reduction in pain at worst from 5/10 to 2/10 or better  Pt will demonstrate 5-8 degree improvement in AROM (L) shoulder flexion & abduction in order to perform reaching activities with more ease  Pt will verbalize being able to perform light shoulder workouts at the gym with minimal to no discomfort during or after   Pt will perform all ADLs and household activities with minimal to no pain  Pt will demonstrate 5/5 (L) RC strength in order to maximize ability to carry heavier items as well as perform workout routines   Pt will demonstrate 4/5 scapular strength throughout in order to better assist proper biomechanics of the shoulder complex with shoulder elevation  Pt will demonstrate performance of HEP independently in order to sustain changes made with therapy sessions               Plan  Re-assess (L) shoulder ROM. Progress strength/stabilization exercises as tolerated    Treatment Last  4 Visits       1/31/2025 2/7/2025 2/14/2025   PT Treatment   Treatment Day  2 3   Therapeutic Exercise Seated shoulder ER GTB 2 x 10   Standing scapular retraction w/GTB shoulder extension (palms out) 2 x 10 (5 second hold)  Standing shoulder IR blue theratube 2 x 10   Seated UT stretch 30 sec x 3  Seated scapular retraction 2 x 10 (5 second hold) OH pulley flexion x 15 (5 second hold)  H/L shoulder horizontal abduction RTB 2 x 15  S/L shoulder ER 2# 2 x 15  Seated (L) UT stretch 30 sec x 3  Seated scapular retraction x 15 (5 second hold)  Standing SA FR up wall x 15  Standing scapular retraction w/shoulder extension (palms out) 2 x 10 (5 second hold)  Standing shoulder circles against wall with YPB x 20 CW/CCW  Standing YTB D2 flexion x 10 UBE x 4 min   OH pulley flexion x 15 (5 second hold)   Prone scapular retraction x 10 (5 second hold)  H/L shoulder horizontal abduction 2 x 15   Seated shoulder ER RTB 2 x 15  Standing scapular retraction w/shoulder extension black theratube 2 x 10 (5 second hold)   Wall push up 2 x 10   Standing D2 flexion YTB 2 x 10   Standing shoulder IR double blue theratube 2 x 10    Manual Therapy STM/MFR (L) infraspinatus, teres major  STM/MFR teres major, subscapularis, pec minor  S/L scapular posterior tilt, medial & inferior glide STM/MFR subscapularis, infraspinatus, periscapular musculature    Therapeutic Exercise Min 10 28 30   Manual Therapy Min 10 15 12   Evaluation Min 25     Total of Timed Procedures 20 43 42   Total of Service Based 25 0 0   Total Treatment Time 45 43 42         HEP  Access Code: [email or web address]  URL: [email or web address]  Date: 01/31/2025  Prepared by: Preston Mullins     Exercises  - Shoulder External Rotation and Scapular Retraction with Resistance  - 1-2 x daily - 7 x weekly - 2 sets - 10 reps  - Seated Scapular Retraction  - 1-2 x daily - 7 x weekly - 2 sets - 10 reps - 5 second hold  - Shoulder Extension with Resistance - Palms Forward  -  1-2 x daily - 7 x weekly - 2 sets - 10 reps - 5 seconds hold  - Shoulder Internal Rotation with Resistance  - 1-2 x daily - 7 x weekly - 2 sets - 10 reps      Charges     TherEx 2, Manual 1

## 2025-02-20 ENCOUNTER — OFFICE VISIT (OUTPATIENT)
Facility: LOCATION | Age: 50
End: 2025-02-20
Payer: COMMERCIAL

## 2025-02-20 DIAGNOSIS — N50.82 SCROTAL PAIN: Primary | ICD-10-CM

## 2025-02-20 PROCEDURE — 99214 OFFICE O/P EST MOD 30 MIN: CPT | Performed by: UROLOGY

## 2025-02-20 NOTE — PROGRESS NOTES
Urology Clinic Note    Primary Care Provider:  Keshav Lovelace MD     Chief Complaint:   Scrotal content pain     HPI:      Patient is a 50 year old male with obesity, DDD, GERD, CICI, HL, who presents today for evaluation of left testicular lump.     Patient was seen by urology in 2022 for testicular pain.  He notes that he had trauma with a roller hockey ball to the testicle in 2021, he did have an ultrasound at that time that was negative.  However, he has had intermittent pain to this area for years before.    He did pelvic floor physical therapy 2022.    More recently, he noticed a left firm lump at the bottom of the testicle.  He presented and saw Alessia Vicente PA-C, exam was most consistent with a epididymal tail cyst.  A testicular ultrasound was done and this showed a complex cyst about 1 cm in size along the left epididymis, likely a spermatocele.  Some calcifications were noted along the left epididymis as well.  Follow-up ultrasound was recommended for the future.  Patient is on TRT from an outside provider. He is now here for evaluation.  Of note, his ultrasound in 2021 did not demonstrate the above cyst.  Patient reports that his scrotal content pain has been on and off for 20 years.  Of note, he does not notice significant pain to the left cyst, slight discomfort with firm palpation.  Otherwise is not bothered by it.  He states his pain is a 2 or 3 out of 10.  Dull, achy pain.  He does note that he had some improvement with the above pelvic for physical therapy, he does try to do some of these exercises at home.  On my review, it appears that this was mostly physical therapy for his back and not scrotal content pain.   He does not think ibuprofen or scrotal support helps very much.  He denies significant constipation.  He does have a history of chronic back pain.       PSA:  No results found for: \"PSA\", \"PERCENTPSA\", \"PSAS\", \"PSAULTRA\", \"QPSA\", \"PSATOT\", \"TOTPSADX\", \"TOTPSASCREEN\"     History:     Past  Medical History:    Arthritis    Back problem    DJD    DDD (degenerative disc disease), lumbar    GERD (gastroesophageal reflux disease)    RESOLVED WITH GLUTEN-FREE DIET    Hx of motion sickness    Hypercholesteremia    LPRD (laryngopharyngeal reflux disease)    Obesity, unspecified    CICI (obstructive sleep apnea)    Sleep apnea    CPAP    Spondylosis    Varicose veins of leg with pain    Visual impairment    GLASSES       Past Surgical History:   Procedure Laterality Date    Cholecystectomy      Colonoscopy  2022    Endovenous laser, 1st vein Right 11/13/2015    Procedure: ENDO LUMINAL LASER ABLATION;  Surgeon: LIONEL Lorenzana MD;  Location: [facility]    Tonsillectomy         Family History   Problem Relation Age of Onset    Cancer Mother         Pancreatic     Diabetes Father     Heart Disorder Father     Heart Surgery Father         CABG    Cancer Father        Social History     Socioeconomic History    Marital status:    Tobacco Use    Smoking status: Former     Types: Cigarettes    Smokeless tobacco: Never    Tobacco comments:     Smoked briefly at 20     Updated 1/2/25   Vaping Use    Vaping status: Never Used   Substance and Sexual Activity    Alcohol use: Not Currently    Drug use: No   Other Topics Concern    Caffeine Concern No    Exercise Yes    Seat Belt No    Special Diet No    Stress Concern No    Weight Concern Yes       Medications (Active prior to today's visit):  Current Outpatient Medications   Medication Sig Dispense Refill    anastrozole 1 MG Oral Tab tab       TESTOSTERONE TD 1 x  WEEK      Tirzepatide-Weight Management (ZEPBOUND) 10 MG/0.5ML Subcutaneous Solution Auto-injector Inject 10 mg into the skin once a week. 2 mL 1    Cholecalciferol (VITAMIN D-3 OR) Take by mouth.      Multiple Vitamins-Minerals (MULTIVITAL-M) Oral Tab Take 1 tablet by mouth daily.         Allergies:  Allergies[1]    Review of Systems:   A comprehensive 10-point review of systems was completed.   Pertinent positives and negatives are noted in the the HPI.    Physical Exam:   CONSTITUTIONAL: Well developed, well nourished, in no acute distress  ABDOMEN: Soft, non-tender, non-distended  GENITOURINARY: Normal phallus, orthotopic meatus, normal bilateral testicles; left epididymal cyst palpable, nontender    Assessment & Plan:   Scrotal content pain  Left spermatocele    Discussed patient's history in detail.  Discussed ultrasound findings, complex epididymal cyst.  Agree with repeat ultrasound in a few months.  On exam, no worrisome findings.  He is nontender.  Patient agreement with observation, repeat imaging soon.  We discussed ongoing conservative care with scrotal support, NSAIDs.  We discussed managing his back pain.  We discussed repeat evaluation by pelvic floor physical therapist, he is in agreement with this.  We did discuss the neck steps could include spermatic cord denervation, spermatic cord block.  I gave him the name of several providers to these procedures.  We will repeat his ultrasound in a couple months and he will follow-up at that time.    In total, 30 minutes were spent on this patient encounter (including chart review, patient history, physical, and counseling, documentation, and communication).    Ketan Carlton MD  Staff Urologist  [facility]  Office: [phone]         [1] No Known Allergies

## 2025-02-21 ENCOUNTER — APPOINTMENT (OUTPATIENT)
Facility: LOCATION | Age: 50
End: 2025-02-21
Attending: PHYSICIAN ASSISTANT
Payer: COMMERCIAL

## 2025-02-28 ENCOUNTER — APPOINTMENT (OUTPATIENT)
Dept: PHYSICAL THERAPY | Age: 50
End: 2025-02-28
Attending: PHYSICIAN ASSISTANT
Payer: COMMERCIAL

## 2025-02-28 ENCOUNTER — OFFICE VISIT (OUTPATIENT)
Facility: LOCATION | Age: 50
End: 2025-02-28
Attending: PHYSICIAN ASSISTANT
Payer: COMMERCIAL

## 2025-02-28 PROCEDURE — 97110 THERAPEUTIC EXERCISES: CPT

## 2025-02-28 NOTE — PROGRESS NOTES
Patient: Emeka Turpin (50 year old, male) Referring Provider:  Insurance:   Diagnosis: Rotator cuff syndrome of left shoulder (M75.102) Conor Lee Ray  vzaar   Date of Surgery: N/A Next MD visit:  N/A   Precautions:  None No data recorded Referral Information:    Date of Evaluation: Req: 0, Auth: 0, Exp:     01/31/25 POC Auth Visits:  8       Today's Date   2/28/2025    Subjective  Feels a little tight today. Certain positions that cause pain are not causing much pain anymore. Not having as much discomfort after workouts.       Pain: 1/10     Objective  Near full (L) shoulder flexion, abduction, ER & IR; prominent (L) scapular protraction and winging noted with HBB reach       Assessment  Tolerated treatment session well minimal to no discomfort. Focus of session was to address scapular stabilization & RC strengthening. He does have some winging of the (L) scapula which may be causing some of his (L) shoulder issues. Overall, he has reported improvement since starting therapy sessions. Denies any paresthesia recently in the UE.    Goals (to be met in 8 visits)   Goals       Therapy Goals     Pt will report 50% reduction in pain at worst from 5/10 to 2/10 or better  Pt will demonstrate 5-8 degree improvement in AROM (L) shoulder flexion & abduction in order to perform reaching activities with more ease  Pt will verbalize being able to perform light shoulder workouts at the gym with minimal to no discomfort during or after   Pt will perform all ADLs and household activities with minimal to no pain  Pt will demonstrate 5/5 (L) RC strength in order to maximize ability to carry heavier items as well as perform workout routines   Pt will demonstrate 4/5 scapular strength throughout in order to better assist proper biomechanics of the shoulder complex with shoulder elevation  Pt will demonstrate performance of HEP independently in order to sustain changes made with therapy sessions                Plan  Continue scapular stabilization & RC/shoulder strengthening    Treatment Last 4 Visits       1/31/2025 2/7/2025 2/14/2025 2/28/2025   PT Treatment   Treatment Day  2 3 4   Therapeutic Exercise Seated shoulder ER GTB 2 x 10   Standing scapular retraction w/GTB shoulder extension (palms out) 2 x 10 (5 second hold)  Standing shoulder IR blue theratube 2 x 10   Seated UT stretch 30 sec x 3  Seated scapular retraction 2 x 10 (5 second hold) OH pulley flexion x 15 (5 second hold)  H/L shoulder horizontal abduction RTB 2 x 15  S/L shoulder ER 2# 2 x 15  Seated (L) UT stretch 30 sec x 3  Seated scapular retraction x 15 (5 second hold)  Standing SA FR up wall x 15  Standing scapular retraction w/shoulder extension (palms out) 2 x 10 (5 second hold)  Standing shoulder circles against wall with YPB x 20 CW/CCW  Standing YTB D2 flexion x 10 UBE x 4 min   OH pulley flexion x 15 (5 second hold)   Prone scapular retraction x 10 (5 second hold)  H/L shoulder horizontal abduction 2 x 15   Seated shoulder ER RTB 2 x 15  Standing scapular retraction w/shoulder extension black theratube 2 x 10 (5 second hold)   Wall push up 2 x 10   Standing D2 flexion YTB 2 x 10   Standing shoulder IR double blue theratube 2 x 10  OH pulley flexion x 20   Prone scapular retraction x 15 (5 second hold)   H/L shoulder horizontal abduction BTB 2 x 10   Standing scapular retraction w/shoulder extension (palms out) BKTB 2 x 10 (5 second hold)  Standing row BKTB/BTB 2 x 10   Standing push up/scapular retraction GPB against wall 2 x 10  SA FR up wall 2 x 10   Standing (L) shoulder IR double BTB 2 x 15  Standing shoulder ER GTB 2 x 15  Standing lower trap set on wall 2 x 10   UBE x 4 min (2 forward/2 backward)  HEP review, updated, handout provided        Manual Therapy STM/MFR (L) infraspinatus, teres major  STM/MFR teres major, subscapularis, pec minor  S/L scapular posterior tilt, medial & inferior glide STM/MFR subscapularis, infraspinatus,  periscapular musculature     Therapeutic Exercise Min 10 28 30 44   Manual Therapy Min 10 15 12    Evaluation Min 25      Total of Timed Procedures 20 43 42 44   Total of Service Based 25 0 0 0   Total Treatment Time 45 43 42 44         HEP  Access Code: [email or web address]  URL: [email or web address]  Date: 02/28/2025  Prepared by: Preston Mullins    Exercises  - Shoulder External Rotation and Scapular Retraction with Resistance  - 1-2 x daily - 7 x weekly - 2 sets - 10 reps  - Shoulder Extension with Resistance - Palms Forward  - 1-2 x daily - 7 x weekly - 2 sets - 10 reps - 5 seconds hold  - Shoulder Internal Rotation with Resistance  - 1-2 x daily - 7 x weekly - 2 sets - 10 reps  - Low Trap Setting at Wall  - 1-2 x daily - 7 x weekly - 2 sets - 10 reps  - Standing Shoulder Single Arm PNF D2 Flexion with Resistance  - 1-2 x daily - 7 x weekly - 2 sets - 10 reps  - Standing Shoulder Row with Anchored Resistance  - 1-2 x daily - 7 x weekly - 2 sets - 10 reps  - Serratus Activation at Wall with Foam Roll  - 1-2 x daily - 7 x weekly - 2 sets - 10 reps  - Wall Push Up  - 1-2 x daily - 7 x weekly - 2 sets - 10 reps      Charges     TherEx 3

## 2025-03-07 ENCOUNTER — APPOINTMENT (OUTPATIENT)
Dept: PHYSICAL THERAPY | Age: 50
End: 2025-03-07
Attending: PHYSICIAN ASSISTANT
Payer: COMMERCIAL

## 2025-03-14 ENCOUNTER — APPOINTMENT (OUTPATIENT)
Dept: PHYSICAL THERAPY | Age: 50
End: 2025-03-14
Attending: PHYSICIAN ASSISTANT
Payer: COMMERCIAL

## 2025-03-21 ENCOUNTER — APPOINTMENT (OUTPATIENT)
Dept: PHYSICAL THERAPY | Age: 50
End: 2025-03-21
Attending: PHYSICIAN ASSISTANT
Payer: COMMERCIAL

## 2025-05-27 ENCOUNTER — OFFICE VISIT (OUTPATIENT)
Dept: FAMILY MEDICINE CLINIC | Facility: CLINIC | Age: 50
End: 2025-05-27
Payer: COMMERCIAL

## 2025-05-27 ENCOUNTER — PATIENT MESSAGE (OUTPATIENT)
Facility: CLINIC | Age: 50
End: 2025-05-27

## 2025-05-27 VITALS
RESPIRATION RATE: 16 BRPM | DIASTOLIC BLOOD PRESSURE: 78 MMHG | WEIGHT: 268 LBS | TEMPERATURE: 97 F | HEIGHT: 68 IN | BODY MASS INDEX: 40.62 KG/M2 | OXYGEN SATURATION: 100 % | SYSTOLIC BLOOD PRESSURE: 124 MMHG | HEART RATE: 83 BPM

## 2025-05-27 DIAGNOSIS — E66.01 OBESITY, MORBID, BMI 40.0-49.9 (HCC): ICD-10-CM

## 2025-05-27 DIAGNOSIS — I25.10 ATHEROSCLEROSIS OF NATIVE CORONARY ARTERY OF NATIVE HEART WITHOUT ANGINA PECTORIS: Primary | ICD-10-CM

## 2025-05-27 DIAGNOSIS — Z00.00 LABORATORY EXAM ORDERED AS PART OF ROUTINE GENERAL MEDICAL EXAMINATION: ICD-10-CM

## 2025-05-27 DIAGNOSIS — R93.1 ELEVATED CORONARY ARTERY CALCIUM SCORE: ICD-10-CM

## 2025-05-27 DIAGNOSIS — E78.5 DYSLIPIDEMIA: ICD-10-CM

## 2025-05-27 PROCEDURE — 99213 OFFICE O/P EST LOW 20 MIN: CPT | Performed by: STUDENT IN AN ORGANIZED HEALTH CARE EDUCATION/TRAINING PROGRAM

## 2025-05-27 NOTE — TELEPHONE ENCOUNTER
Requesting zepbound 10 mg  LOV: 2/11/25 video  RTC: not noted  Last Relevant Labs: na  Filled: 2/11/25 #2 ml with 1 refills

## 2025-05-27 NOTE — PROGRESS NOTES
Subjective:        Chief Complaint   Patient presents with    Test Results     Here to discuss calcium scores     HISTORY OF PRESENT ILLNESS  HPI  HPI obtained per patient report.  Emeka Turpin is a pleasant 50 year old male presenting to discuss new calcium score results.   He states that he updated a heart scan (calcium score test) last month for screening, and his calcium score was 6. This is the first time his calcium score has been elevated.     PAST PATIENT HISTORY  Past Medical History[1]  Past Surgical History[2]    CURRENT MEDICATIONS  Medications Taking[3]    HEALTH MAINTENANCE  Immunization History   Administered Date(s) Administered    >=3 YRS QUAD MULTIDOSE VIAL (08019) FLU CLINIC 09/01/2015, 09/23/2016, 10/26/2017    Covid-19 Vaccine Pfizer 30 mcg/0.3 ml 03/30/2021, 04/20/2021, 12/08/2021    FLU VAC QIV SPLIT 3 YRS AND OLDER (13022) 10/26/2017    FLUAD High Dose 65 yr and older (17560) 02/07/2022    FLULAVAL 6 months & older 0.5 ml Prefilled syringe (95995) 10/19/2020, 02/23/2023    FLUZONE 6 months and older PFS 0.5 ml (36247) 10/19/2020, 02/07/2022, 10/30/2023    Influenza Vaccine, trivalent (IIV3), PF 0.5mL (03846) 10/29/2024    TDAP 02/10/2023       ALLERGIES AND DRUG REACTIONS  Allergies[4]    Family History[5]  Short Social Hx on File[6]    Review of Systems   All other systems reviewed and are negative.         Objective:      /78   Pulse 83   Temp 97.4 °F (36.3 °C) (Temporal)   Resp 16   Ht 5' 8\" (1.727 m)   Wt 268 lb (121.6 kg)   SpO2 100%   BMI 40.75 kg/m²   Body mass index is 40.75 kg/m².    Physical Exam  Vitals reviewed.   Constitutional:       General: He is not in acute distress.     Appearance: He is not ill-appearing or toxic-appearing.   Cardiovascular:      Rate and Rhythm: Normal rate.   Pulmonary:      Effort: Pulmonary effort is normal.   Musculoskeletal:      Cervical back: Neck supple.      Right lower leg: No edema.      Left lower leg: No edema.   Neurological:       Mental Status: He is alert and oriented to person, place, and time.   Psychiatric:         Mood and Affect: Mood normal.            Assessment and Plan:      1. Atherosclerosis of native coronary artery of native heart without angina pectoris (Primary)  2. Elevated coronary artery calcium score  3. Dyslipidemia  4. Laboratory exam ordered as part of routine general medical examination  -     Hemoglobin A1C; Future; Expected date: 09/27/2025  -     Lipid Panel; Future; Expected date: 09/27/2025  -     TSH W Reflex To Free T4; Future; Expected date: 09/27/2025  -     Vitamin D; Future; Expected date: 09/27/2025  -     CBC With Differential With Platelet; Future; Expected date: 09/27/2025  -     Comp Metabolic Panel (14); Future; Expected date: 09/27/2025    Return in about 5 months (around 10/29/2025) for physical.    - new calcium score of 6 last month, correlating with very mild calcified coronary atherosclerotic plaque   - we discussed cardiovascular risk factor management, including BP control, treatment of his dyslipidemia, BG control, and weight management  - he has already been working on weight management with healthy dietary modifications, regular exercise, and zepbound and is encouraged to continue doing so  - lab orders to complete in advance of his annual physical in the fall were provided in advance today to reassess his risk factors      Patient verbalized understanding of assessment and recommendations. All questions and concerns were addressed.    Electronically signed by Keshav Lovelace MD         [1]   Past Medical History:   Arthritis    Back problem    DJD    CAD (coronary artery disease)    DDD (degenerative disc disease), lumbar    GERD (gastroesophageal reflux disease)    RESOLVED WITH GLUTEN-FREE DIET    Hx of motion sickness    Hypercholesteremia    LPRD (laryngopharyngeal reflux disease)    Obesity, unspecified    CICI (obstructive sleep apnea)    Sleep apnea    CPAP    Spondylosis    Varicose  veins of leg with pain    Visual impairment    GLASSES   [2]   Past Surgical History:  Procedure Laterality Date    Cholecystectomy      Colonoscopy  2022    Endovenous laser, 1st vein Right 11/13/2015    Procedure: ENDO LUMINAL LASER ABLATION;  Surgeon: LIONEL Lorenzana MD;  Location: [facility]    Tonsillectomy     [3]   Outpatient Medications Marked as Taking for the 5/27/25 encounter (Office Visit) with Keshav Lovelace MD   Medication Sig Dispense Refill    Tirzepatide-Weight Management (ZEPBOUND) 10 MG/0.5ML Subcutaneous Solution Auto-injector Inject 10 mg into the skin once a week. 2 mL 1    Cholecalciferol (VITAMIN D-3 OR) Take by mouth.      anastrozole 1 MG Oral Tab tab       TESTOSTERONE TD 1 x  WEEK      Multiple Vitamins-Minerals (MULTIVITAL-M) Oral Tab Take 1 tablet by mouth daily.     [4] No Known Allergies  [5]   Family History  Problem Relation Age of Onset    Cancer Mother         Pancreatic     Obesity Mother     Diabetes Father     Heart Disorder Father     Heart Surgery Father         CABG    Cancer Father     Colon Cancer Father    [6]   Social History  Socioeconomic History    Marital status:    Tobacco Use    Smoking status: Former     Current packs/day: 0.00     Types: Cigarettes    Smokeless tobacco: Never    Tobacco comments:     Smoked briefly at 20   Vaping Use    Vaping status: Never Used   Substance and Sexual Activity    Alcohol use: Not Currently    Drug use: No   Other Topics Concern    Caffeine Concern No    Exercise Yes    Seat Belt No    Special Diet No    Stress Concern No    Weight Concern Yes

## 2025-05-28 ENCOUNTER — TELEPHONE (OUTPATIENT)
Facility: CLINIC | Age: 50
End: 2025-05-28

## 2025-05-29 RX ORDER — TIRZEPATIDE 10 MG/.5ML
10 INJECTION, SOLUTION SUBCUTANEOUS WEEKLY
Qty: 2 ML | Refills: 1 | Status: SHIPPED | OUTPATIENT
Start: 2025-05-29

## 2025-06-30 ENCOUNTER — OFFICE VISIT (OUTPATIENT)
Facility: CLINIC | Age: 50
End: 2025-06-30
Payer: COMMERCIAL

## 2025-06-30 VITALS
WEIGHT: 257 LBS | OXYGEN SATURATION: 97 % | BODY MASS INDEX: 38.95 KG/M2 | HEIGHT: 68 IN | SYSTOLIC BLOOD PRESSURE: 134 MMHG | DIASTOLIC BLOOD PRESSURE: 74 MMHG | HEART RATE: 86 BPM | RESPIRATION RATE: 18 BRPM

## 2025-06-30 DIAGNOSIS — R79.89 LOW TESTOSTERONE: ICD-10-CM

## 2025-06-30 DIAGNOSIS — Z51.81 THERAPEUTIC DRUG MONITORING: Primary | ICD-10-CM

## 2025-06-30 DIAGNOSIS — E66.812 CLASS 2 SEVERE OBESITY WITH SERIOUS COMORBIDITY AND BODY MASS INDEX (BMI) OF 39.0 TO 39.9 IN ADULT, UNSPECIFIED OBESITY TYPE (HCC): ICD-10-CM

## 2025-06-30 DIAGNOSIS — E66.01 CLASS 2 SEVERE OBESITY WITH SERIOUS COMORBIDITY AND BODY MASS INDEX (BMI) OF 39.0 TO 39.9 IN ADULT, UNSPECIFIED OBESITY TYPE (HCC): ICD-10-CM

## 2025-06-30 DIAGNOSIS — G47.33 OSA (OBSTRUCTIVE SLEEP APNEA): ICD-10-CM

## 2025-06-30 DIAGNOSIS — E78.5 HYPERLIPIDEMIA, UNSPECIFIED HYPERLIPIDEMIA TYPE: ICD-10-CM

## 2025-06-30 PROCEDURE — 99214 OFFICE O/P EST MOD 30 MIN: CPT | Performed by: PHYSICIAN ASSISTANT

## 2025-06-30 RX ORDER — TIRZEPATIDE 12.5 MG/.5ML
12.5 INJECTION, SOLUTION SUBCUTANEOUS WEEKLY
Qty: 2 ML | Refills: 0 | Status: SHIPPED | OUTPATIENT
Start: 2025-06-30 | End: 2025-07-22

## 2025-06-30 NOTE — PROGRESS NOTES
HISTORY OF PRESENT ILLNESS  Chief Complaint   Patient presents with    Weight Check     -10lbs       Emeka Turpin is a 50 year old male here for follow up in medical weight loss program.   -10lbs  Compliant on zepbound  Denies chest pain, shortness of breath, dizziness, blurred vision, headache, paresthesia, nausea/vomiting.   Feels like food choices have been better  Has been doing factor meals, for one meal a day  Not skipping meals, more small meals    Exercise/Activity: struggling to find the time, tries to get to the gym at least 2 days a week, biking  Nutrition: 24 hour food log reviewed, eating regular meals, +protein  Stress: 5/10, manageable  Sleep: 6 hours/night     [facility] Follow Up    General Information  Success Moment: Breaking under 270, as I haven’t been that in years  Challenging Moment: Slow weight loss now  Nutrition Recall  Breakfast: Protein shake Lunch: Usually lunch meat or protein bar   Dinner: Prepared meal now, factor meals Snacks: Meat sticks or protein   bars   Fluids: Water usually around 64 ounces, coffee, Diet Coke, vitimin water   Dining Out: 3   Exercise   Patient stated exercises # days/week: 2  Patient stated perceived level of   exertion: 4 Anaerobic Days: 2   Aerobic Days: 2   Patient stated average level of stress: 5  Sleep   Patient stated # hours uninterrupted sleep: 6   Patient stated feels   restful: No      Cause of disruption of sleep: Probably neck or back pain   Goals: Talk about next steps to get to less than 230              Wt Readings from Last 6 Encounters:   06/30/25 257 lb (116.6 kg)   05/27/25 268 lb (121.6 kg)   01/20/25 273 lb (123.8 kg)   10/29/24 273 lb 6 oz (124 kg)   07/25/24 286 lb (129.7 kg)   05/30/24 (!) 302 lb 2 oz (137 kg)            Breakfast Lunch Dinner Snacks Fluids   Reviewed           REVIEW OF SYSTEMS  GENERAL HEALTH: feels well otherwise, denied any fevers chills or night sweats   RESPIRATORY: denies shortness of breath   CARDIOVASCULAR: denies  chest pain  GI: denies abdominal pain    EXAM  /74   Pulse 86   Resp 18   Ht 5' 8\" (1.727 m)   Wt 257 lb (116.6 kg)   SpO2 97%   BMI 39.08 kg/m²   GENERAL: well developed, well nourished,in no apparent distress, A/O x3  SKIN: no rashes,no suspicious lesions  HEENT: atraumatic, normocephalic, OP-clear, PERRL  NECK: supple,no adenopathy  LUNGS: clear to auscultation bilaterally   CARDIO: RRR without murmur  GI: good BS's,NT/ND, no masses or HSM  EXTREMITIES: no cyanosis, no clubbing, no edema    Lab Results   Component Value Date    WBC 6.5 01/16/2025    RBC 5.80 (H) 01/16/2025    HGB 16.2 01/16/2025    HCT 49.0 01/16/2025    MCV 84.5 01/16/2025    MCH 27.9 01/16/2025    MCHC 33.1 01/16/2025    RDW 13.2 01/16/2025    .0 01/16/2025     Lab Results   Component Value Date    GLU 82 01/16/2025    BUN 16 01/16/2025    BUNCREA 18.4 02/14/2023    CREATSERUM 1.12 01/16/2025    ANIONGAP 2 01/16/2025    GFR 97 10/09/2017    GFRNAA 78 04/24/2022    GFRAA 90 04/24/2022    CA 10.0 01/16/2025    OSMOCALC 282 01/16/2025    ALKPHO 39 (L) 01/16/2025    AST 26 01/16/2025    ALT 25 01/16/2025    BILT 0.7 01/16/2025    TP 7.2 01/16/2025    ALB 4.5 01/16/2025    GLOBULIN 2.7 01/16/2025     01/16/2025    K 4.4 01/16/2025     01/16/2025    CO2 30.0 01/16/2025     Lab Results   Component Value Date     01/17/2024    A1C 5.4 01/17/2024     Lab Results   Component Value Date    CHOLEST 145 01/16/2025    TRIG 116 01/16/2025    HDL 29 (L) 01/16/2025    LDL 95 01/16/2025    VLDL 19 01/16/2025    TCHDLRATIO 3.90 10/09/2017    NONHDLC 116 01/16/2025     Lab Results   Component Value Date    TSH 1.770 02/14/2023     No results found for: \"B12\", \"VITB12\"  No results found for: \"VITD\", \"QVITD\", \"AADX10BH\"    Medications Ordered Prior to Encounter[1]    ASSESSMENT  Analyzed weight data:       Diagnoses and all orders for this visit:    Therapeutic drug monitoring  -     Tirzepatide-Weight Management (ZEPBOUND) 12.5  MG/0.5ML Subcutaneous Solution Auto-injector; Inject 12.5 mg into the skin once a week for 4 doses.    Class 2 severe obesity with serious comorbidity and body mass index (BMI) of 39.0 to 39.9 in adult, unspecified obesity type (HCC)  -     Tirzepatide-Weight Management (ZEPBOUND) 12.5 MG/0.5ML Subcutaneous Solution Auto-injector; Inject 12.5 mg into the skin once a week for 4 doses.    Hyperlipidemia, unspecified hyperlipidemia type  -     Tirzepatide-Weight Management (ZEPBOUND) 12.5 MG/0.5ML Subcutaneous Solution Auto-injector; Inject 12.5 mg into the skin once a week for 4 doses.    CICI (obstructive sleep apnea)  -     Tirzepatide-Weight Management (ZEPBOUND) 12.5 MG/0.5ML Subcutaneous Solution Auto-injector; Inject 12.5 mg into the skin once a week for 4 doses.    Low testosterone        PLAN  Initial Weight: 286lbs  Initial Weight Date: 7/25/24  Today's Weight: 257lbs  5% Goal: 14.3lbs  10% Goal: 28.6lbs  Total Weight Loss: -10lbs/Net loss 29lbs    Will continue and increase zepbound - advised side effects and adverse effects of medication   HLD - lifestyle changes  CICI - CPAP compliance, zepbound  Low testosterone - continue to work with specialist, incorporate more strength training  Consistency with logging foods - protein and produce  Incorporate more strength/resistance training  Nutrition: low carb diet/ recommended to eat breakfast daily/ regular protein intake  Medication use and side effects reviewed with patient.  Medication contraindications: stimulant  Follow up with dietitian and psychologist as recommended.  Discussed the role of sleep and stress in weight management.  Counseled on comprehensive weight loss plan including attention to nutrition, exercise and behavior/stress management for success. See patient instruction below for more details.  Discussed strategies to overcome barriers to successful weight loss and weight maintenance  FITTE: ACSM recommendations (150-300 minutes/ week in active  weight loss)   Weight Loss consent to treat reviewed and signed       NOTE TO PATIENT: The 21st Century Cures Act makes clinical notes like these available to patients in the interest of transparency. Clinical notes are medical documents used by physicians and care providers to communicate with each other. These documents include medical language and terminology, abbreviations, and treatment information that may sound technical and at times possibly unfamiliar. In addition, at times, the verbiage may appear blunt or direct. These documents are one tool providers use to communicate relevant information and clinical opinions of the care providers in a way that allows common understanding of the clinical context.     There are no Patient Instructions on file for this visit.    No follow-ups on file.    Patient verbalizes understanding.    Laura James PA-C  6/30/2025           [1]   Current Outpatient Medications on File Prior to Visit   Medication Sig Dispense Refill    Tirzepatide-Weight Management (ZEPBOUND) 10 MG/0.5ML Subcutaneous Solution Auto-injector Inject 10 mg into the skin once a week. 2 mL 1    Cholecalciferol (VITAMIN D-3 OR) Take by mouth.      anastrozole 1 MG Oral Tab tab       TESTOSTERONE TD 1 x  WEEK      Multiple Vitamins-Minerals (MULTIVITAL-M) Oral Tab Take 1 tablet by mouth daily.       No current facility-administered medications on file prior to visit.

## 2025-07-23 ENCOUNTER — HOSPITAL ENCOUNTER (OUTPATIENT)
Dept: ULTRASOUND IMAGING | Age: 50
Discharge: HOME | End: 2025-07-23
Attending: PHYSICIAN ASSISTANT
Payer: COMMERCIAL

## 2025-07-23 DIAGNOSIS — N43.40 SPERMATOCELE OF EPIDIDYMIS: ICD-10-CM

## 2025-07-23 PROCEDURE — 93975 VASCULAR STUDY: CPT | Performed by: PHYSICIAN ASSISTANT

## 2025-07-23 PROCEDURE — 76870 US EXAM SCROTUM: CPT | Performed by: PHYSICIAN ASSISTANT

## 2025-07-31 ENCOUNTER — OFFICE VISIT (OUTPATIENT)
Facility: LOCATION | Age: 50
End: 2025-07-31
Payer: COMMERCIAL

## 2025-07-31 DIAGNOSIS — N50.82 SCROTAL PAIN: Primary | ICD-10-CM

## 2025-07-31 DIAGNOSIS — N43.40 SPERMATOCELE OF EPIDIDYMIS: ICD-10-CM

## 2025-07-31 PROCEDURE — 99214 OFFICE O/P EST MOD 30 MIN: CPT

## 2025-08-16 ENCOUNTER — PATIENT MESSAGE (OUTPATIENT)
Facility: CLINIC | Age: 50
End: 2025-08-16

## (undated) DEVICE — DRIVER NDL DIA8MM MEGA SUT CUT XI ENDOWRIST

## (undated) DEVICE — SCISSORS SUR 8MM MPLR CRV ENDOWRIST DA VINCI

## (undated) DEVICE — Device

## (undated) DEVICE — NEEDLE VERESS 120MM

## (undated) DEVICE — SEAL CANN 5-8MM FOR DA VINCI XI/X ROBOTIC SYS

## (undated) DEVICE — SUTURE V-LOC 90 SZ 2-0 L9IN ABSRB VLT L27MM

## (undated) DEVICE — LAPAROVUE VISIBILITY SYS

## (undated) DEVICE — COVER TIP FOR HOT SHR INSTR DAVINCI ENDOWRIST

## (undated) DEVICE — COVER LT HNDL RIG FOR SUR CAM DISP

## (undated) DEVICE — ROBOTIC GENERAL CUSTOM PK

## (undated) DEVICE — GLOVE SUR 8 PROTEXIS PIP CRM PWD F

## (undated) DEVICE — OBTURATOR ROBOTIC DIA8MM STD OPT BLDELSS

## (undated) DEVICE — ADHESIVE SKIN TOP FOR WND CLSR DERMBND ADV

## (undated) DEVICE — SUTURE MCRYL 4-0 18IN ABSRB UD 19MM PS-2 3/8

## (undated) DEVICE — SYSTEM POS W20XH1XL29IN PT PIGAZZI

## (undated) DEVICE — DRAPE EQUIP CLMN ROBOTIC DISP DA VINCI XI

## (undated) DEVICE — DRAPE EXT W21XH19XL10.5IN DA VINCI XI

## (undated) DEVICE — COVER WAND CLR RF DTECT RF ASSURE

## (undated) DEVICE — TRAY CATH 16FR F INCL BARDX IC COMPLT CARE

## (undated) DEVICE — SUTURE V-LOC 180 SZ 0 L30CM ABSRB GRN L37MM

## (undated) NOTE — LETTER
23    Patient: Rk Gonzalez  : 1975 Visit date: 2023    Dear  Emmett Callejas MD    Thank you for referring Rk Gonzalez to my practice. Please find my assessment and plan below. Assessment   1. Ventral hernia without obstruction or gangrene          Plan     The patient will be scheduled for robotic repair of incarcerated ventral hernia with mesh. The hal-operative care plan was discussed with the patient, who voices understanding. Activity and lifting recommendations were discussed in length. The risks, benefits, and alternatives to the procedure were explained to the patient. The risks explained include, but are not limited to, bleeding, infection, pain wound complications, recurrence, incorrect diagnosis, injury to adjacent organs and structures. We also discussed the possibile need for further therapeutic, diagnostic, or surgical intervention. The patient voiced understanding, and after all questions were answered to the patient's satisfaction, the patient provided willing and informed consent to proceed.       Sincerely,       Alisson Rausch MD   CC:   No Recipients

## (undated) NOTE — LETTER
24    Patient: Emeka Turpin  : 1975 Visit date: 2024    Dear  Keshav Lovelace MD    Thank you for referring Emeka Turpin to my practice.  Please find my assessment and plan below.    Assessment   1. Ventral hernia without obstruction or gangrene          Plan     The patient is recovering nicely following robotic repair of incarcerated ventral hernia with mesh.    The anticipated postoperative recovery was discussed with the patient in detail.    Dietary, activity, and exercise recommendations along with restrictions were discussed with the patient during today's visit.    Wound care instructions were discussed during today's visit.    The patient will return to my attention on an as needed basis.    The patient is encouraged to continue seeing the primary care physician for ongoing medical needs.    The patient was given ample opportunity to ask questions.  The patient's questions were answered in detail and to the patient's satisfaction.  The patient voiced understanding of the postoperative care plan.             Sincerely,       Emesron Perdomo MD   CC:   No Recipients

## (undated) NOTE — ED AVS SNAPSHOT
Jose Salgado   MRN: [id number]    Department:  [facility] Emergency Department   Date of Visit:  9/11/2018           Disclosure     Insurance plans vary and the physician(s) referred by the ER may not be covered by your plan.  Please contact yo tell this physician (or your personal doctor if your instructions are to return to your personal doctor) about any new or lasting problems. The primary care or specialist physician will see patients referred from the [facility] Emergency Department.  Ifeoma Lovell

## (undated) NOTE — LETTER
11/21/18        Arlen Philip [id number]      Dear Chelo Calixto,    [facility] records indicate that you have outstanding lab work and or testing that was ordered for you and has not yet been completed:  Orders Placed This Encounter

## (undated) NOTE — LETTER
02/06/20        Nathan Rivera [id number]      Dear Moody Hendricks,    [facility] records indicate that you have outstanding lab work and or testing that was ordered for you and has not yet been completed:      US TESTICLES (CPT=76870) - Pl

## (undated) NOTE — LETTER
11/21/18        Davidson Daniel [id number]      Dear Valerio Strickland,    [facility] records indicate that you have outstanding lab work and or testing that was ordered for you and has not yet been completed:  Orders Placed This Encounter